# Patient Record
Sex: FEMALE | Race: ASIAN | NOT HISPANIC OR LATINO | Employment: FULL TIME | URBAN - METROPOLITAN AREA
[De-identification: names, ages, dates, MRNs, and addresses within clinical notes are randomized per-mention and may not be internally consistent; named-entity substitution may affect disease eponyms.]

---

## 2017-11-03 ENCOUNTER — HOSPITAL ENCOUNTER (OUTPATIENT)
Dept: MAMMOGRAPHY | Facility: HOSPITAL | Age: 52
Discharge: HOME | End: 2017-11-03
Attending: FAMILY MEDICINE

## 2017-11-03 DIAGNOSIS — Z12.31 VISIT FOR SCREENING MAMMOGRAM: ICD-10-CM

## 2017-11-10 ENCOUNTER — RECORDS - HEALTHEAST (OUTPATIENT)
Dept: ADMINISTRATIVE | Facility: OTHER | Age: 52
End: 2017-11-10

## 2017-11-29 ENCOUNTER — RECORDS - HEALTHEAST (OUTPATIENT)
Dept: ADMINISTRATIVE | Facility: OTHER | Age: 52
End: 2017-11-29

## 2018-08-07 ENCOUNTER — COMMUNICATION - HEALTHEAST (OUTPATIENT)
Dept: SCHEDULING | Facility: CLINIC | Age: 53
End: 2018-08-07

## 2018-08-13 ENCOUNTER — OFFICE VISIT - HEALTHEAST (OUTPATIENT)
Dept: FAMILY MEDICINE | Facility: CLINIC | Age: 53
End: 2018-08-13

## 2018-08-13 DIAGNOSIS — F41.9 ANXIETY: ICD-10-CM

## 2018-08-13 DIAGNOSIS — F32.0 MILD MAJOR DEPRESSION (H): ICD-10-CM

## 2018-08-13 DIAGNOSIS — R00.2 PALPITATIONS: ICD-10-CM

## 2018-08-13 LAB
ALBUMIN SERPL-MCNC: 4.2 G/DL (ref 3.5–5)
ALP SERPL-CCNC: 67 U/L (ref 45–120)
ALT SERPL W P-5'-P-CCNC: 17 U/L (ref 0–45)
ANION GAP SERPL CALCULATED.3IONS-SCNC: 12 MMOL/L (ref 5–18)
AST SERPL W P-5'-P-CCNC: 15 U/L (ref 0–40)
ATRIAL RATE - MUSE: 69 BPM
BILIRUB SERPL-MCNC: 0.3 MG/DL (ref 0–1)
BUN SERPL-MCNC: 15 MG/DL (ref 8–22)
CALCIUM SERPL-MCNC: 9.6 MG/DL (ref 8.5–10.5)
CHLORIDE BLD-SCNC: 110 MMOL/L (ref 98–107)
CO2 SERPL-SCNC: 21 MMOL/L (ref 22–31)
CREAT SERPL-MCNC: 0.69 MG/DL (ref 0.6–1.1)
DIASTOLIC BLOOD PRESSURE - MUSE: NORMAL MMHG
ERYTHROCYTE [DISTWIDTH] IN BLOOD BY AUTOMATED COUNT: 11.7 % (ref 11–14.5)
GFR SERPL CREATININE-BSD FRML MDRD: >60 ML/MIN/1.73M2
GLUCOSE BLD-MCNC: 90 MG/DL (ref 70–125)
HCT VFR BLD AUTO: 41.5 % (ref 35–47)
HGB BLD-MCNC: 13.7 G/DL (ref 12–16)
INTERPRETATION ECG - MUSE: NORMAL
MCH RBC QN AUTO: 28.6 PG (ref 27–34)
MCHC RBC AUTO-ENTMCNC: 33.1 G/DL (ref 32–36)
MCV RBC AUTO: 87 FL (ref 80–100)
P AXIS - MUSE: 23 DEGREES
PLATELET # BLD AUTO: 306 THOU/UL (ref 140–440)
PMV BLD AUTO: 7.4 FL (ref 7–10)
POTASSIUM BLD-SCNC: 4.5 MMOL/L (ref 3.5–5)
PR INTERVAL - MUSE: 164 MS
PROT SERPL-MCNC: 7.3 G/DL (ref 6–8)
QRS DURATION - MUSE: 86 MS
QT - MUSE: 402 MS
QTC - MUSE: 430 MS
R AXIS - MUSE: 30 DEGREES
RBC # BLD AUTO: 4.8 MILL/UL (ref 3.8–5.4)
SODIUM SERPL-SCNC: 143 MMOL/L (ref 136–145)
SYSTOLIC BLOOD PRESSURE - MUSE: NORMAL MMHG
T AXIS - MUSE: 37 DEGREES
TSH SERPL DL<=0.005 MIU/L-ACNC: 0.7 UIU/ML (ref 0.3–5)
VENTRICULAR RATE- MUSE: 69 BPM
WBC: 6.2 THOU/UL (ref 4–11)

## 2018-08-16 ENCOUNTER — HOSPITAL ENCOUNTER (OUTPATIENT)
Dept: CARDIOLOGY | Facility: CLINIC | Age: 53
Discharge: HOME | End: 2018-08-16
Attending: FAMILY MEDICINE

## 2018-08-16 DIAGNOSIS — R00.2 PALPITATIONS: ICD-10-CM

## 2018-08-17 ENCOUNTER — COMMUNICATION - HEALTHEAST (OUTPATIENT)
Dept: FAMILY MEDICINE | Facility: CLINIC | Age: 53
End: 2018-08-17

## 2018-09-19 ENCOUNTER — OFFICE VISIT - HEALTHEAST (OUTPATIENT)
Dept: FAMILY MEDICINE | Facility: CLINIC | Age: 53
End: 2018-09-19

## 2018-09-19 DIAGNOSIS — F41.9 ANXIETY: ICD-10-CM

## 2018-09-19 DIAGNOSIS — R00.2 PALPITATIONS: ICD-10-CM

## 2018-09-19 DIAGNOSIS — Z00.00 ROUTINE GENERAL MEDICAL EXAMINATION AT A HEALTH CARE FACILITY: ICD-10-CM

## 2018-09-19 DIAGNOSIS — F32.0 MILD MAJOR DEPRESSION (H): ICD-10-CM

## 2018-09-19 LAB
CHOLEST SERPL-MCNC: 181 MG/DL
FASTING STATUS PATIENT QL REPORTED: YES
FASTING STATUS PATIENT QL REPORTED: YES
GLUCOSE BLD-MCNC: 97 MG/DL (ref 70–99)
HDLC SERPL-MCNC: 46 MG/DL
LDLC SERPL CALC-MCNC: 119 MG/DL
TRIGL SERPL-MCNC: 82 MG/DL

## 2018-09-19 ASSESSMENT — MIFFLIN-ST. JEOR: SCORE: 1354.88

## 2018-09-20 LAB
HPV SOURCE: NORMAL
HUMAN PAPILLOMA VIRUS 16 DNA: NEGATIVE
HUMAN PAPILLOMA VIRUS 18 DNA: NEGATIVE
HUMAN PAPILLOMA VIRUS FINAL DIAGNOSIS: NORMAL
HUMAN PAPILLOMA VIRUS OTHER HR: NEGATIVE
SPECIMEN DESCRIPTION: NORMAL

## 2018-09-28 LAB
BKR LAB AP ABNORMAL BLEEDING: NO
BKR LAB AP BIRTH CONTROL/HORMONES: NORMAL
BKR LAB AP CERVICAL APPEARANCE: NORMAL
BKR LAB AP GYN ADEQUACY: NORMAL
BKR LAB AP GYN INTERPRETATION: NORMAL
BKR LAB AP HPV REFLEX: NORMAL
BKR LAB AP LMP: NORMAL
BKR LAB AP PATIENT STATUS: NORMAL
BKR LAB AP PREVIOUS ABNORMAL: NORMAL
BKR LAB AP PREVIOUS NORMAL: 2012
HIGH RISK?: NO
PATH REPORT.COMMENTS IMP SPEC: NORMAL
RESULT FLAG (HE HISTORICAL CONVERSION): NORMAL

## 2018-12-14 ENCOUNTER — HOSPITAL ENCOUNTER (OUTPATIENT)
Dept: MAMMOGRAPHY | Facility: CLINIC | Age: 53
Discharge: HOME | End: 2018-12-14
Attending: FAMILY MEDICINE

## 2018-12-14 DIAGNOSIS — Z12.31 VISIT FOR SCREENING MAMMOGRAM: ICD-10-CM

## 2018-12-17 ENCOUNTER — HOSPITAL ENCOUNTER (OUTPATIENT)
Dept: MAMMOGRAPHY | Facility: CLINIC | Age: 53
Discharge: HOME | End: 2018-12-17
Attending: FAMILY MEDICINE

## 2019-02-04 ENCOUNTER — OFFICE VISIT - HEALTHEAST (OUTPATIENT)
Dept: FAMILY MEDICINE | Facility: CLINIC | Age: 54
End: 2019-02-04

## 2019-02-04 DIAGNOSIS — R20.0 ARM NUMBNESS LEFT: ICD-10-CM

## 2019-02-04 DIAGNOSIS — R06.83 SNORES: ICD-10-CM

## 2019-02-11 ENCOUNTER — HOSPITAL ENCOUNTER (OUTPATIENT)
Dept: PHYSICAL MEDICINE AND REHAB | Facility: CLINIC | Age: 54
Discharge: HOME | End: 2019-02-11
Attending: FAMILY MEDICINE

## 2019-02-11 DIAGNOSIS — R20.0 ARM NUMBNESS LEFT: ICD-10-CM

## 2019-04-05 ENCOUNTER — OFFICE VISIT - HEALTHEAST (OUTPATIENT)
Dept: SLEEP MEDICINE | Facility: CLINIC | Age: 54
End: 2019-04-05

## 2019-04-05 DIAGNOSIS — E66.3 OVERWEIGHT: ICD-10-CM

## 2019-04-05 DIAGNOSIS — R06.83 SNORING: ICD-10-CM

## 2019-04-05 DIAGNOSIS — G47.10 EXCESSIVE SLEEPINESS: ICD-10-CM

## 2019-04-05 ASSESSMENT — MIFFLIN-ST. JEOR: SCORE: 1339.57

## 2019-12-23 ENCOUNTER — OFFICE VISIT - HEALTHEAST (OUTPATIENT)
Dept: FAMILY MEDICINE | Facility: CLINIC | Age: 54
End: 2019-12-23

## 2019-12-23 DIAGNOSIS — Z23 NEED FOR IMMUNIZATION AGAINST INFLUENZA: ICD-10-CM

## 2019-12-23 DIAGNOSIS — Z00.00 ROUTINE GENERAL MEDICAL EXAMINATION AT A HEALTH CARE FACILITY: ICD-10-CM

## 2019-12-23 DIAGNOSIS — M79.621 PAIN OF RIGHT UPPER ARM: ICD-10-CM

## 2019-12-23 DIAGNOSIS — R20.2 PARESTHESIA: ICD-10-CM

## 2019-12-23 ASSESSMENT — ANXIETY QUESTIONNAIRES
4. TROUBLE RELAXING: SEVERAL DAYS
6. BECOMING EASILY ANNOYED OR IRRITABLE: NOT AT ALL
3. WORRYING TOO MUCH ABOUT DIFFERENT THINGS: SEVERAL DAYS
IF YOU CHECKED OFF ANY PROBLEMS ON THIS QUESTIONNAIRE, HOW DIFFICULT HAVE THESE PROBLEMS MADE IT FOR YOU TO DO YOUR WORK, TAKE CARE OF THINGS AT HOME, OR GET ALONG WITH OTHER PEOPLE: NOT DIFFICULT AT ALL
5. BEING SO RESTLESS THAT IT IS HARD TO SIT STILL: NOT AT ALL
1. FEELING NERVOUS, ANXIOUS, OR ON EDGE: SEVERAL DAYS
7. FEELING AFRAID AS IF SOMETHING AWFUL MIGHT HAPPEN: NOT AT ALL
2. NOT BEING ABLE TO STOP OR CONTROL WORRYING: NOT AT ALL
GAD7 TOTAL SCORE: 3

## 2019-12-23 ASSESSMENT — PATIENT HEALTH QUESTIONNAIRE - PHQ9: SUM OF ALL RESPONSES TO PHQ QUESTIONS 1-9: 2

## 2019-12-23 ASSESSMENT — MIFFLIN-ST. JEOR: SCORE: 1340.71

## 2020-01-24 ENCOUNTER — HOSPITAL ENCOUNTER (OUTPATIENT)
Dept: MAMMOGRAPHY | Facility: CLINIC | Age: 55
Discharge: HOME | End: 2020-01-24
Attending: FAMILY MEDICINE

## 2020-01-24 DIAGNOSIS — Z12.31 VISIT FOR SCREENING MAMMOGRAM: ICD-10-CM

## 2020-02-10 ENCOUNTER — HOSPITAL ENCOUNTER (OUTPATIENT)
Dept: PHYSICAL MEDICINE AND REHAB | Facility: CLINIC | Age: 55
Discharge: HOME | End: 2020-02-10
Attending: FAMILY MEDICINE

## 2020-02-10 DIAGNOSIS — R20.2 PARESTHESIA: ICD-10-CM

## 2020-02-28 ENCOUNTER — COMMUNICATION - HEALTHEAST (OUTPATIENT)
Dept: FAMILY MEDICINE | Facility: CLINIC | Age: 55
End: 2020-02-28

## 2020-04-06 ENCOUNTER — RECORDS - HEALTHEAST (OUTPATIENT)
Dept: ADMINISTRATIVE | Facility: OTHER | Age: 55
End: 2020-04-06

## 2020-04-09 ENCOUNTER — RECORDS - HEALTHEAST (OUTPATIENT)
Dept: ADMINISTRATIVE | Facility: OTHER | Age: 55
End: 2020-04-09

## 2020-04-27 ENCOUNTER — RECORDS - HEALTHEAST (OUTPATIENT)
Dept: ADMINISTRATIVE | Facility: OTHER | Age: 55
End: 2020-04-27

## 2021-05-26 ASSESSMENT — PATIENT HEALTH QUESTIONNAIRE - PHQ9: SUM OF ALL RESPONSES TO PHQ QUESTIONS 1-9: 2

## 2021-05-27 NOTE — PROGRESS NOTES
Dear  Perico Viramontes Md  [address] [place] [address]    Thank you for the opportunity to participate in the care of  Marge Simmons.    Marge Simmons is sent by Perico Viramontes Th* for a sleep consultation regarding snoring and excessive sleepiness.     She has a history of depression and anxiety.    Schedule - Works in [employer] service at [employer].  Typically working 9 AM - 2 PM TW.    Wakes around 6 - 6:30 AM and usually doesn't need 6:50 AM alarm, although she won't get out of bed until closer to 6:50 AM.  No napping.  Gets into bed anywhere between 10:30 PM and midnight.    Sleep Disordered Breathing - Snoring is loud enough to wake . Has snort arousals but no witnessed apneas.   Has nocturia once per night.  No nocturnal GERD.   Upon waking feels ok.  She denies morning headaches.  Does get morning dry mouth.  No morning sinus congestion.   Patient was counseled on the importance of driving while alert, to pull over if drowsy, or nap before getting into the vehicle if sleepy.    Movement/Behaviors - No somniloquy.  No somnambulism.  No sleep related eating.  No dream enactment behavior.   She reports bruxism. Has splint and retainer.     Patient denies typical restless legs syndrome symptoms.    Alcohol use - None  Caffeine intake - coffee 1 - 2 cups /day. Last caffeine intake is usually in the morning.  Tobacco exposure - none  Illicit substances - none    Lives with  and 2 kids (16 and 12).  Has 1 pet, a dog.     Did have family history of snoring in father, but no formally diagnosed Obstructive Sleep Apnea.    Past Medical History:  History reviewed. No pertinent past medical history.    Problem List:  Patient Active Problem List    Diagnosis Date Noted     Mild major depression (H) 09/19/2018     Anxiety 09/19/2018     Overweight      Overview Note:     Created by Conversion         External Hemorrhoids With Bleeding      Overview Note:     Created by  Conversion            Past Surgical History:  Past Surgical History:   Procedure Laterality Date      SECTION  2007     DILATION AND CURETTAGE, DIAGNOSTIC / THERAPEUTIC  2008     OOPHORECTOMY Bilateral 2012     OVARIAN CYST REMOVAL  2007     [place] MYOMECTOMY 1-4,W/TOT 250GMS/<,ABD APPRCH  1999    Uterine Myomectomy         Meds:  Current Outpatient Medications   Medication Sig Dispense Refill     miscellaneous medical supply Misc Massage Therapy  Evaluate and Treat as indicated by protocol  Dx: pain and paresthesia 1 each 0     miscellaneous medical supply Misc Acupuncture Therapy  Evaluate and Treat as indicated by protocol  Dx: pain and paresthesia 1 each 0     multivitamin with minerals (THERA-M) 9 mg iron-400 mcg Tab tablet Take 1 tablet by mouth daily.       No current facility-administered medications for this visit.         Allergies:  Azithromycin     Social History:  Social History     Socioeconomic History     Marital status:      Spouse name: Not on file     Number of children: Not on file     Years of education: Not on file     Highest education level: Not on file   Occupational History     Not on file   Social Needs     Financial resource strain: Not on file     Food insecurity:     Worry: Not on file     Inability: Not on file     Transportation needs:     Medical: Not on file     Non-medical: Not on file   Tobacco Use     Smoking status: Never Smoker     Smokeless tobacco: Never Used   Substance and Sexual Activity     Alcohol use: No     Drug use: Not on file     Sexual activity: Not on file   Lifestyle     Physical activity:     Days per week: Not on file     Minutes per session: Not on file     Stress: Not on file   Relationships     Social connections:     Talks on phone: Not on file     Gets together: Not on file     Attends Yazdanism service: Not on file     Active member of club or organization: Not on file     Attends meetings of clubs or organizations:  "Not on file     Relationship status: Not on file     Intimate partner violence:     Fear of current or ex partner: Not on file     Emotionally abused: Not on file     Physically abused: Not on file     Forced sexual activity: Not on file   Other Topics Concern     Not on file   Social History Narrative     Not on file        Family History:  Family History   Problem Relation Age of Onset     Prostate cancer Father         stage 1     Colon cancer Maternal Grandmother 55     Parkinsonism Maternal Grandfather      Stomach cancer Paternal Grandfather 79     Tongue cancer Maternal Uncle 60        Review of Systems: Changes in vision  Constitutional: Negative except as noted in HPI.   Eyes: Negative except as noted in HPI.   ENT: Negative except as noted in HPI.   Cardiovascular: Negative except as noted in HPI.   Respiratory: Negative except as noted in HPI.   Gastrointestinal: Negative except as noted in HPI.   Genitourinary: Negative except as noted in HPI.   Musculoskeletal: Negative except as noted in HPI.   Integumentary: Negative except as noted in HPI.   Neurological: Negative except as noted in HPI.   Psychiatric: Negative except as noted in HPI.   Endocrine: Negative except as noted in HPI.   Hematologic/Lymphatic: Negative except as noted in HPI.      Physical Exam:  /66 (Patient Site: Right Arm, Patient Position: Sitting, Cuff Size: Adult Regular)   Pulse 62   Ht 5' 3.5\" (1.613 m)   Wt 167 lb (75.8 kg)   SpO2 98%   BMI 29.12 kg/m    General appearance: No apparent distress, well groomed.    HEENT:   Head: Normocephalic, atraumatic.  Eyes: PERRL  Nose: Nares patent.  No exudate.  No septal deviation noted.  Mouth: Teeth: Normal   Tongue: Scalloped  Oropharynx:  Mallampati Classification: III    Tonsils: Grade 2 B and low    Uvula: Normal    Neck: Supple without bruit.      Cardiac: Regular rate and rhythm.  No murmurs.  Radial pulses are strong and symmetric.  Pulmonary: Symmetric air movement, " lungs clear to auscultation bilaterally.  Musculoskeletal: No edema noted.  No clubbing or cyanosis.  Skin: Warm, dry, intact.  Neurologic: Alert and oriented to person, place and time   Gait is normal.  Psychiatric: Affect pleasant.  Mood ok.     Labs/Studies:  Lab Results   Component Value Date    WBC 6.2 08/13/2018    HGB 13.7 08/13/2018    HCT 41.5 08/13/2018    MCV 87 08/13/2018     08/13/2018         Chemistry        Component Value Date/Time     08/13/2018 1434    K 4.5 08/13/2018 1434     (H) 08/13/2018 1434    CO2 21 (L) 08/13/2018 1434    BUN 15 08/13/2018 1434    CREATININE 0.69 08/13/2018 1434    GLU 97 09/19/2018 0959        Component Value Date/Time    CALCIUM 9.6 08/13/2018 1434    ALKPHOS 67 08/13/2018 1434    AST 15 08/13/2018 1434    ALT 17 08/13/2018 1434    BILITOT 0.3 08/13/2018 1434            Lab Results   Component Value Date    FERRITIN 64 06/09/2014     Lab Results   Component Value Date    TSH 0.70 08/13/2018     No results found for: HGBA1C    STOP BANG 4/5/2019   Do you snore loudly (louder than talking or loud enough to be heard through closed doors)? 1   Do you often feel tired, fatigued, or sleepy during daytime? 1   Has anyone observed you stop breathing in your sleep? 0   Do you have or are you being treated for high blood pressure? 0   BMI more than 35 kg/m2 0   Age over 50 years old? 1   Neck circumference greater than 16 inches? 0   Gender male? 0   Total Score 3     Assessment and Plan:  1. Snoring  I have a low-to-intermediate suspicion for STEPHON based on presence of snoring and ESS. We discussed pathophysiology of obstructive sleep apnea, testing with overnight polysomnography, and treatment modalities (CPAP only discussed at this visit). We discussed consequences of untreated STEPHON. Patient is interested in treatment and willing to undergo overnight sleep testing.    Home sleep testing is inappropriate for this patient due to lack of high pretest probability  for moderate to severe STEPHON.    Study has been ordered today.      In case of insomnia during the study:  one-time medication has been ordered.   - Split Night Sleep Study; Future    2. Excessive sleepiness  Concern for STEPHON vs insufficient sleep.  - Split Night Sleep Study; Future    3. Overweight  We discussed the link between weight, sleep apnea, and health outcomes.  Patient was encouraged to decrease caloric intake and increase activity levels to try to move towards a normal weight.  She was encouraged to discuss further strategies with her primary care provider.   - Split Night Sleep Study; Future     Patient verbalized understanding of these issues, agrees with the plan and all questions were answered today. Patient was given an opportuntity to voice any other symptoms or concerns not listed above. Patient did not have any other symptoms or concerns.      Al Dias MD  [facility] Board Certified in Internal Medicine and Sleep Medicine  [facility] [place].

## 2021-05-28 ASSESSMENT — ANXIETY QUESTIONNAIRES: GAD7 TOTAL SCORE: 3

## 2021-05-29 ENCOUNTER — RECORDS - HEALTHEAST (OUTPATIENT)
Dept: ADMINISTRATIVE | Facility: CLINIC | Age: 56
End: 2021-05-29

## 2021-05-30 ENCOUNTER — RECORDS - HEALTHEAST (OUTPATIENT)
Dept: ADMINISTRATIVE | Facility: CLINIC | Age: 56
End: 2021-05-30

## 2021-05-31 ENCOUNTER — RECORDS - HEALTHEAST (OUTPATIENT)
Dept: ADMINISTRATIVE | Facility: CLINIC | Age: 56
End: 2021-05-31

## 2021-06-01 VITALS — BODY MASS INDEX: 30.29 KG/M2 | WEIGHT: 171 LBS

## 2021-06-02 ENCOUNTER — RECORDS - HEALTHEAST (OUTPATIENT)
Dept: ADMINISTRATIVE | Facility: CLINIC | Age: 56
End: 2021-06-02

## 2021-06-02 VITALS — HEIGHT: 64 IN | WEIGHT: 170.38 LBS | BODY MASS INDEX: 29.09 KG/M2

## 2021-06-02 VITALS — WEIGHT: 173.2 LBS | BODY MASS INDEX: 30.2 KG/M2

## 2021-06-02 VITALS — HEIGHT: 64 IN | WEIGHT: 167 LBS | BODY MASS INDEX: 28.51 KG/M2

## 2021-06-04 VITALS
HEART RATE: 72 BPM | WEIGHT: 169 LBS | HEIGHT: 63 IN | DIASTOLIC BLOOD PRESSURE: 70 MMHG | BODY MASS INDEX: 29.95 KG/M2 | SYSTOLIC BLOOD PRESSURE: 112 MMHG

## 2021-06-06 NOTE — TELEPHONE ENCOUNTER
Left message for patient about her EMG.  Mild carpal tunnel right wrist otherwise negative EMG.  If she is still symptomatic (arm numbness), I asked her to call back and I will give her a referral to orthopedic.

## 2021-06-06 NOTE — PATIENT INSTRUCTIONS - HE
Thank you for choosing the [facility] for your EMG testing.    The ordering provider will receive your final EMG results within the next few days.  Please follow up with your provider for the results and further treatment recommendations.

## 2021-06-06 NOTE — PROGRESS NOTES
Patient presents at the request of Dr. Perico Ludwig for a right upper extremity EMG.  She has 1 month history of right-sided neck parascapular pain with right arm pain and paresthesias to digits 1 through 3.  She is left-handed.    EMG/NCS  results: Please see scanned full report.    Comment NCS: Abnormal study:    1.  Prolonged right median versus ulnar transcarpal latency comparison.  2.  Normal right median ulnar radial SNAPs, ulnar transcarpal study, median and ulnar CMAPs.    Comment EMG: Normal study.  1.  Normal needle EMG right upper extremity.    Interpretation: Abnormal study: There is electrodiagnostic evidence of:    1.  Right median neuropathy at the wrist consistent with entrapment in the carpal tunnel, very mild in severity.    2. There is no electrodiagnostic evidence of cervical radiculopathy, brachial plexopathy, or ulnar neuropathy in the right upper extremity.    Note: Clinically, this could represent an EMG negative cervical radiculopathy.  If she continues to have a symptoms despite conservative management, can consider cervical MRI for further evaluation.    The testing was completed in its entirety by the physician.      It was our pleasure caring for your patient today, if there any questions or concerns please do not hesitate to contact us.

## 2021-06-06 NOTE — TELEPHONE ENCOUNTER
Test Results  Who is calling?:  Patient  Who ordered the test:    Perico Viramontes MD  Type of test: Other: EMG  Date of test:  2/10/2020  Where was the test performed:    [facility]  What are your questions/concerns?:    Patient is requesting results of test.  Okay to leave a detailed message?:  Yes

## 2021-06-16 PROBLEM — F32.0 MILD MAJOR DEPRESSION (H): Status: ACTIVE | Noted: 2018-09-19

## 2021-06-16 PROBLEM — F41.9 ANXIETY: Status: ACTIVE | Noted: 2018-09-19

## 2021-06-19 NOTE — PROGRESS NOTES
"ASSESSMENT/PLAN:    Palpitations  This is a 53-year-old female who presented with brief episode of vibration and pressure at the base of her neck.  Symptoms appeared consistent with that of anxiety and depression.  We spoke about treatment for anxiety depression and patient opted for psychotherapy.  EKG was unremarkable.  Because she is not symptomatic now I will like her to wear a portable arrhythmia monitor for a week.  We will also obtain CBC, CMP, and thyroid cascade.  I would like the patient to follow-up with me in 1 month.  We spoke about differential diagnoses and workup as discussed above.  She verbalized understanding and agreed with plan  -     Ambulatory referral for EKG  -     Electrocardiogram Perform and Read  -     Thyroid Cascade  -     HM2(CBC w/o Differential)  -     MILTON Hook-Up; Future    Mild major depression (H), anxiety  We spoke about pharmacotherapy and psychotherapy.  Patient opted for psychotherapy.  I also discussed with the patient about reaching out to her  connecting with him on an intermittent level rather than \"best friends\".  Motivational interviewing was utilized today.  I will ask her to follow-up with me in 1 month  -     Ambulatory referral to Psychology    SUBJECTIVE:    Marge Simmons is a 53 y.o. female who came in today for concerns regarding palpitation.  2 weeks ago while on a road trip to explore her son's college, patient fell palpitation for which she described to be pulsatory pressure sensation at the base of her neck.  This occurred for about 5 - 6 days continuously.  It had stopped several days ago.  During the time of this episode, she did not have any chest pain, shortness of breath, exertional dyspnea, dysphagia, odynophagia, nausea, vomiting, abdominal pain.  She was drinking more caffeine than she normally consumes.  She was also worrying about finances and paying for her son's college.  He is also worried about her aging parents in [place].  Her relationship " "with her  is considered \"best friend\" although she would like to take it to an intimate level.      More than half the days she has lack of interest and pleasure in doing things.  More than half the days she has trouble with sleep and energy.  More than half the days she has difficulty with appetite and feels bad about herself.  She does not have any suicidal homicidal ideation.  PHQ9=14    More than half the days she has trouble relaxing.  On several days she feels nervous and anxious on edge.  On several days she feels that she can control her worrying worries about different things.  On several days she feels easily annoyed and irritable.  On several days she feels like something awful might happen to her.  GAD7=7    At this time she takes no medications.    Review of Systems (except those mentioned above)  Constitutional: Negative.   HENT: Negative.   Eyes: Negative.   Respiratory: Negative.   Cardiovascular: Negative.   Gastrointestinal: Negative.   Endocrine: Negative.   Genitourinary: Negative.   Musculoskeletal: Negative.   Skin: Negative.   Allergic/Immunologic: Negative.   Neurological: Negative.   Hematological: Negative.   Psychiatric/Behavioral: Negative.     Patient Active Problem List    Diagnosis Date Noted     Overweight      External Hemorrhoids With Bleeding      Allergies   Allergen Reactions     Azithromycin      Current Outpatient Prescriptions   Medication Sig Dispense Refill     multivitamin with minerals (THERA-M) 9 mg iron-400 mcg Tab tablet Take 1 tablet by mouth daily.       No current facility-administered medications for this visit.      No past medical history on file.  Past Surgical History:   Procedure Laterality Date      SECTION  2007     DILATION AND CURETTAGE, DIAGNOSTIC / THERAPEUTIC  2008     OOPHORECTOMY Bilateral 2012     OVARIAN CYST REMOVAL  2007     [place] MYOMECTOMY 1-4,W/TOT 250GMS/<,ABD APPRCH  1999    Uterine Myomectomy      Social " History     Social History     Marital status:      Spouse name: N/A     Number of children: N/A     Years of education: N/A     Social History Main Topics     Smoking status: Never Smoker     Smokeless tobacco: Never Used     Alcohol use None     Drug use: None     Sexual activity: Not Asked     Other Topics Concern     None     Social History Narrative     Family History   Problem Relation Age of Onset     Prostate cancer Father      stage 1     Colon cancer Maternal Grandmother 55     Parkinsonism Maternal Grandfather      Stomach cancer Paternal Grandfather 79     Tongue cancer Maternal Uncle 60         OBJECTIVE:    Vitals:    08/13/18 1336   BP: 94/68   Patient Site: Left Arm   Patient Position: Sitting   Cuff Size: Adult Large   Pulse: 76   Weight: 171 lb (77.6 kg)     Body mass index is 30.29 kg/(m^2).    Physical Exam:  Constitutional: Patient was oriented to person, place, and time. Patient appeared well-developed and well-nourished. No distress.   Head: Normocephalic and atraumatic.   Right Ear: External ear normal. Normal TM  Left Ear: External ear normal. Normal TM  Nose: Nose normal.   Mouth/Throat: Oropharynx was clear and moist. No oropharyngeal exudate.   Eyes: Conjunctivae and EOM were normal. Pupils were equal, round, and reactive to light. Right eye exhibited no discharge. Left eye exhibited no discharge. No scleral icterus.   Neck: Neck supple. No JVD present. No tracheal deviation present. No thyromegaly present.   Lymphadenopathy:  Patient has no cervical adenopathy.   Cardiovascular: Normal rate, regular rhythm, normal heart sounds and intact distal pulses. No murmur heard.   Pulmonary/Chest: Effort normal and breath sounds normal. No stridor. No respiratory distress. Patient had no wheezes, no rales, exhibits no tenderness.   The patient has good eye contact.  No psychomotor retardation or stereotypical behaviors.  Speech was regular rate, regular rhythm, adequate responses.  Mood was  stable and affect was congruent mood.  No suicidal or homicidal intent.  No hallucination.      Results for orders placed or performed in visit on 08/13/18   Electrocardiogram Perform and Read   Result Value Ref Range    SYSTOLIC BLOOD PRESSURE  mmHg    DIASTOLIC BLOOD PRESSURE  mmHg    VENTRICULAR RATE 69 BPM    ATRIAL RATE 69 BPM    P-R INTERVAL 164 ms    QRS DURATION 86 ms    Q-T INTERVAL 402 ms    QTC CALCULATION (BEZET) 430 ms    P Axis 23 degrees    R AXIS 30 degrees    T AXIS 37 degrees    MUSE DIAGNOSIS       Normal sinus rhythm  Normal ECG  No previous ECGs available

## 2021-06-20 NOTE — PROGRESS NOTES
FEMALE PREVENTATIVE EXAM    Assessment and Plan:     Routine general medical examination at a health care facility  We discussed healthy lifestyle, nutrition, cardiovascular risk reduction, self care, safety, sunscreen, seatbelt, and timing of cancer screening.  Health maintenance screening and immunizations reviewed with the patient.  -     Lipid Cascade  -     Glucose  -     Gynecologic Cytology (PAP Smear)  -     Td, Preservative Free (green label)    Palpitations  Normal EKG  Unremarkable Holter  Suspect anxiety  Episodes are occurring less frequently    Mild major depression (H), Anxiety  Stable with psychotherapy      Next follow up: Yearly for preventative visits    Immunization Review  Adult Imm Review: Due today, orders placed    I discussed the following with the patient:   Adult Healthy Living: Importance of regular exercise  Healthy nutrition      Subjective:   Chief Complaint: Marge Simmons is an 53 y.o. female here for a preventative health visit.     HPI: The patient does not have any questions or concerns today.  Her palpitation for which I saw her a month ago or lessening as well.  She is not having any worsening depression and anxiety symptoms.  She had opted for psychotherapy over pharmacotherapy during our last visit.    Healthy Habits  Are you taking a daily aspirin? No  Do you typically exercising at least 40 min, 3-4 times per week?  Yes  Do you usually eat at least 4 servings of fruit and vegetables a day, include whole grains and fiber and avoid regularly eating high fat foods? Yes  Have you had an eye exam in the past two years? Yes  Do you see a dentist twice per year? Yes  Do you have any concerns regarding sleep? YES    Safety Screen  If you own firearms, are they secured in a locked gun cabinet or with trigger locks? The patient does not own any firearms  No Data Recorded    Review of Systems:  Please see above.  The rest of the review of systems are negative for all systems.     Pap  "History:   Yes - updated in Problem List and Health Maintenance accordingly  Cancer Screening       Status Date      PAP SMEAR Overdue 1/30/2017      Done 1/30/2012 GYNECOLOGIC CYTOLOGY (PAP SMEAR)    MAMMOGRAM Next Due 11/3/2018      Done 11/3/2017 MAMMO SCREENING BILATERAL     Patient has more history with this topic...    COLONOSCOPY Next Due 2/20/2023      Done 2/20/2013 ENDOSCOPY, COLON          Patient Care Team:  Perico Ludwig MD as PCP - General        History     Reviewed By Date/Time Sections Reviewed    Adarsh Dikc CMA 9/19/2018  9:22 AM Tobacco            Objective:   Vital Signs:   Visit Vitals     /70     Pulse 60     Ht 5' 3.5\" (1.613 m)     Wt 170 lb 6 oz (77.3 kg)     BMI 29.71 kg/m2          PHYSICAL EXAM    Objective:    Physical Exam   Vitals:    09/19/18 0922   BP: 118/70   Pulse: 60      Constitutional: Patient is oriented to person, place, and time. Patient appears well-developed and well-nourished. No distress.   Head: Normocephalic and atraumatic.   Right Ear: External ear normal.   Left Ear: External ear normal.   Nose: Nose normal.   Mouth/Throat: Oropharynx is clear and moist. No oropharyngeal exudate.   Eyes: Conjunctivae and EOM are normal. Pupils are equal, round, and reactive to light. Right eye exhibits no discharge. Left eye exhibits no discharge. No scleral icterus.   Neck: Neck supple. No JVD present. No tracheal deviation present. No thyromegaly present.   Cardiovascular: Normal rate, regular rhythm, normal heart sounds and intact distal pulses. No murmur heard.   Pulmonary/Chest: Effort normal and breath sounds normal. No stridor. No respiratory distress. Patient has no wheezes, no rales, exhibits no tenderness.   Abdominal: Soft. Bowel sounds are normal. Patient exhibits no distension and no mass. There is no tenderness. There is no rebound and no guarding.   Lymphadenopathy:  Patient has no cervical adenopathy.   Neurological: Patient is alert and " oriented to person, place, and time. Patient has normal reflexes. No cranial nerve deficit. Coordination normal.   Skin: Skin is warm and dry. No rash noted. Patient is not diaphoretic. No erythema. No pallor.   Normal breast exam  Normal pelvic exam    The 10-year ASCVD risk score (Jamelili SANTOS Jr, et al., 2013) is: 1.1%    Values used to calculate the score:      Age: 53 years      Sex: Female      Is Non- : No      Diabetic: No      Tobacco smoker: No      Systolic Blood Pressure: 118 mmHg      Is BP treated: No      HDL Cholesterol: 48 mg/dL      Total Cholesterol: 165 mg/dL         Medication List          These changes are accurate as of 9/19/18 11:46 AM.  If you have any questions, ask your nurse or doctor.               CONTINUE taking these medications          multivitamin with minerals 9 mg iron-400 mcg Tab tablet   Also known as:  THERA-M   INSTRUCTIONS:  Take 1 tablet by mouth daily.

## 2021-06-23 NOTE — PATIENT INSTRUCTIONS - HE
Thank you for choosing the [facility] for your EMG testing.    The ordering provider will receive your final EMG results within the next few days.  Please follow up with your provider for the results and further treatment recommendations.

## 2021-06-23 NOTE — PROGRESS NOTES
"ASSESSMENT/PLAN:  Arm numbness left  Chronic history of left pinky stiffness followed by a recent one month h/o constant pain and paresthesia of the left arm (fingers to base of neck).  We discussed differential diagnoses (neuropathy, cervical radiculopathy, etc.).  Will get EMG.  I gave her a prescription for massage and acupuncture as needed   -     EMG- Left Arm; Future  -     miscellaneous medical supply Misc; Massage Therapy  Evaluate and Treat as indicated by protocol  Dx: pain and paresthesia  -     miscellaneous medical supply Misc; Acupuncture Therapy  Evaluate and Treat as indicated by protocol  Dx: pain and paresthesia    Snores and daytime fatigue  -     Ambulatory referral to Sleep Medicine    SUBJECTIVE:    Marge Simmons is a 53 y.o. female who came in today complaining of constant paraesthesia of her L arm, hand, and fingers. Slight intermittent paraesthesia of her fingers has been present for many years however during the past 4 weeks the numbness and tingling has spread to her L elbow, arm, hand, and fingers. She is L side dominant. Patient has chronic stiffness and intermittent \"aches\" in her neck but denies sharp pain or limited ROM. She is searching for a reputable massage therapist, PT, or acupuncturist and is requesting a referral.   Patient snores at night. She wears a mouthguard for teeth-grinding that is supposed to help her snore less. Her  is \"a very deep sleeper\" but complains lately that she wakes him up snoring and that it's possible she stops breathing at times. Pt has never had a sleep study done.    Patient was struggling with her anxiety and depression more last summer. She explains that she is feeling better mentally and her heart palpitations are coming less often now that she isn't so anxious. Pt notes her ability to exercise has decreased as she has become more fatigued. She wonders if her fatigue could have something to do with her sleep habits and does not think her " fatigue is mood-related.      STOP BANG screen  Snore: yes  Tired: yes  Observe apnea: no  Pressure, blood: no  BMI >35: no  Age >50: yes  Neck size large: no  Gender, male: no  Intermediate risk:  3      Review of Systems (except those mentioned above)  Constitutional: Negative.   HENT: Negative.   Eyes: Negative.   Respiratory: Negative.   Cardiovascular: Negative.   Gastrointestinal: Negative.   Endocrine: Negative.   Genitourinary: Negative.   Musculoskeletal: Negative.   Skin: Negative.   Allergic/Immunologic: Negative.   Neurological: Negative.   Hematological: Negative.   Psychiatric/Behavioral: Negative.     Patient Active Problem List    Diagnosis Date Noted     Mild major depression (H) 2018     Anxiety 2018     Overweight      External Hemorrhoids With Bleeding      Allergies   Allergen Reactions     Azithromycin      Current Outpatient Medications   Medication Sig Dispense Refill     multivitamin with minerals (THERA-M) 9 mg iron-400 mcg Tab tablet Take 1 tablet by mouth daily.       miscellaneous medical supply [facility] Massage Therapy  Evaluate and Treat as indicated by protocol  Dx: pain and paresthesia 1 each 0     miscellaneous medical supply [facility] Acupuncture Therapy  Evaluate and Treat as indicated by protocol  Dx: pain and paresthesia 1 each 0     No current facility-administered medications for this visit.      No past medical history on file.  Past Surgical History:   Procedure Laterality Date      SECTION  2007     DILATION AND CURETTAGE, DIAGNOSTIC / THERAPEUTIC  2008     OOPHORECTOMY Bilateral 2012     OVARIAN CYST REMOVAL  2007     CO MYOMECTOMY 1-4,W/TOT 250GMS/<,ABD APPRCH  1999    Uterine Myomectomy      Social History     Socioeconomic History     Marital status:      Spouse name: None     Number of children: None     Years of education: None     Highest education level: None   Social Needs     Financial resource strain: None     Food  insecurity - worry: None     Food insecurity - inability: None     Transportation needs - medical: None     Transportation needs - non-medical: None   Occupational History     None   Tobacco Use     Smoking status: Never Smoker     Smokeless tobacco: Never Used   Substance and Sexual Activity     Alcohol use: No     Drug use: None     Sexual activity: None   Other Topics Concern     None   Social History Narrative     None     Family History   Problem Relation Age of Onset     Prostate cancer Father         stage 1     Colon cancer Maternal Grandmother 55     Parkinsonism Maternal Grandfather      Stomach cancer Paternal Grandfather 79     Tongue cancer Maternal Uncle 60     OBJECTIVE:    Vitals:    02/04/19 1311   BP: 104/62   Patient Site: Left Arm   Patient Position: Sitting   Cuff Size: Adult Large   Pulse: 76   Weight: 173 lb 3.2 oz (78.6 kg)     Body mass index is 30.2 kg/m .    Physical Exam:  Constitutional: Patient was oriented to person, place, and time. Patient appeared well-developed and well-nourished. No distress.   Head: Normocephalic and atraumatic.   Right Ear: External ear normal.   Left Ear: External ear normal.   Nose: Nose normal.   Eyes: Conjunctivae and EOM were normal. Right eye exhibited no discharge. Left eye exhibited no discharge. No scleral icterus.   Neck: Neck supple. No JVD present. No tracheal deviation present. No thyromegaly present.   Neurological: Patient was alert and oriented to person, place, and time. No cranial nerve deficit. Coordination normal.   Skin: Skin was warm and dry. No rash noted. Patient was not diaphoretic. No erythema. No pallor.   Extremities (L Arm): Full ROM, no swelling, no abnormality, no deformity.      Results for orders placed or performed in visit on 09/19/18   Lipid Cascade   Result Value Ref Range    Cholesterol 181 <=199 mg/dL    Triglycerides 82 <=149 mg/dL    HDL Cholesterol 46 (L) >=50 mg/dL    LDL Calculated 119 <=129 mg/dL    Patient Fasting >  8hrs? Yes    Glucose   Result Value Ref Range    Glucose 97 70 - 99 mg/dL    Patient Fasting > 8hrs? Yes    Gynecologic Cytology (PAP Smear)   Result Value Ref Range    Case Report       Gynecologic Cytology Report                       Case: [id number]                                   Authorizing Provider:  Perico William         Collected:           09/19/2018 1102                                     MD Oziel                                                                 Ordering Location:     [facility]   Received:            09/19/2018 1102                                     Family Medicine/OB                                                           First Screen:          BRADY Bates                                                                             (ASCP)                                                                       Specimen:    SUREPATH PAP, SCREENING, Endocervical/cervical                                             Interpretation       Negative for squamous intraepithelial lesion or malignancy    Result Flag Normal Normal    Specimen Adequacy       Satisfactory for evaluation, endocervical/transformation zone component present    HPV Reflex? Yes regardless of result     HIGH RISK No     LMP/Menopause Date oophorectomy 2011     Abnormal Bleeding No     Pt Status na     Birth Control/Hormones None     Previous Normal/Date 2012     Prev Abn Date/Dx none     Cervical Appearance normal    HPV High Risk DNA Cervical   Result Value Ref Range    HPV Source SurePath     HPV16 DNA Negative NEG    HPV18 DNA Negative NEG    Other HR HPV Negative NEG    Final Diagnosis SEE NOTES     Specimen Description Cervical Cells      ADDITIONAL HISTORY SUMMARIZED (2): None.   DECISION TO OBTAIN EXTRA INFORMATION (1): None.   RADIOLOGY TESTS (1): None.  LABS (1): Thyroid cascade and glucose labs were reviewed from 9/19/2018, both were normal.   MEDICINE TESTS (1): L arm EMG  ordered today. Sleep study ordered today.   INDEPENDENT REVIEW (2 each): None.     Total data points = 3    By signing my name below, I, Savannah Da Silva, attest that this documentation has been prepared under the direction and in the presence of Dr. Harika William.  Electronic Signature: Jason Melissa. 02/04/2019 13:22.    I, Dr. Perico Ludwig MD , personally performed the services described in this documentation. All medical record entries made by the scribe were at my direction and in my presence. I have reviewed the chart and discharge instructions (if applicable) and agree that the record reflects my personal performance and is accurate and complete.

## 2021-06-23 NOTE — PROGRESS NOTES
Patient presents at the request of Dr. Nataliia Ludwig for left upper extremity EMG.  She has a years of left fifth digit greater than right locking and also left hand numbness and tingling throughout the whole hand and arm worse at night when she sleeps and recently with chopping while cooking.  She is left-handed.    EMG/NCS  results: Please see scanned full report.    Comment NCS: Abnormal study:    1.  Prolonged left median SNAP latency and borderline amplitude.  2.  Prolonged latency left median transcarpal study with low amplitude.  3.  Prolonged left median versus ulnar transcarpal latency comparison.  4.  Normal left ulnar and radial SNAPs, ulnar transcarpal study, median and ulnar CMAPs.    Comment EMG: Normal study.  1.  Normal needle EMG left upper extremity.    Interpretation: Abnormal study:    1.  Left median neuropathy at the wrist consistent with entrapment in the carpal tunnel, mild in severity.    2. There is no electrodiagnostic evidence of cervical radiculopathy, brachial plexopathy, or ulnar neuropathy in the left upper extremity.    Note: Patient may wish to trial over-the-counter carpal tunnel brace at night when she sleeps and will follow up with her primary care provider for further recommendations.    The testing was completed in its entirety by the physician.      It was our pleasure caring for your patient today, if there any questions or concerns please do not hesitate to contact us.

## 2021-06-26 ENCOUNTER — HEALTH MAINTENANCE LETTER (OUTPATIENT)
Age: 56
End: 2021-06-26

## 2021-06-28 NOTE — PROGRESS NOTES
Progress Notes by Perico Viramontes MD at 12/23/2019  4:00 PM     Author: Perico Viramontes MD Service: -- Author Type: Physician    Filed: 12/23/2019  4:33 PM Encounter Date: 12/23/2019 Status: Signed    : Perico Viramontes MD (Physician)       FEMALE PREVENTATIVE EXAM    Assessment and Plan:     Routine general medical examination at a health care facility  We discussed healthy lifestyle, nutrition, cardiovascular risk reduction, self care, safety, sunscreen, seatbelt, and timing of cancer screening.  Health maintenance screening and immunizations reviewed with the patient.     Need for immunization against influenza  -     Influenza,Seasonal,Quad,INJ =/>6months    Paresthesia, right arm  Started with neck and shoulder pain 1 month ago then move to pain and paresthesia of right elbow to right fingers.  EMG to localize the problem.  Patient requested chiropractic services as well  -     EMG- Right Arm; Future    Pain of right upper arm  -     Ambulatory referral to Chiropractic    Next follow up:  Return in about 1 year (around 12/23/2020).    Immunization Review  Adult Imm Review: Due today, orders placed    I discussed the following with the patient:   Adult Healthy Living: Importance of regular exercise  Healthy nutrition  Getting adequate sleep  Stress management    Subjective:   Chief Complaint: Marge Simmons is an 54 y.o. female here for a preventative health visit.     HPI:   Depression/Anxiety: She has a history of depression and anxiety. She will occasionally get anxious over small things in her life. However, it is not out of her control. She is not currently taking any medications for it. She is not concerned about her anxiety or depression.     Neck/Shoulder Pain: She started experiencing right neck and shoulder pain about a month ago. The pain was sharp. The pain in the neck is no longer present, but the shoulder pain and aching is active. The pain has now  "traveled into her right fingers causing numbness and tingling for the past two weeks. These symptoms have limited her ability to bake these last few weeks. She feels like the right arm is weak. She is left hand dominant. She has tried ibuprofen and aleve without any relief. She inquires about a prescription for chiropractic treatment.     Dark Spot in Right Armpit: She had a \"big\" uterina fibro that a physician wanted to shrink. The physician gave her hormones which clogged her gland in her arm pits. She currently has a scar in the right armpit. She has a dark spot in the right armpit. She inquires if it is a black head.     Health Maintenance: She is not fasting today. She is due for an influenza vaccination today. She had a normal pap smear in 2018. She had a negative mammogram in 2018. She has a 3-D mammogram scheduled in January. She had a colonoscopy in 2013 which was normal.     Review of Systems: Please see above. The rest of the review of systems are negative for all systems.     PSFH:  Her oldest child is a senior in high school. Her son has applied to colleges and is starting to hear back from schools.     Healthy Habits  Are you taking a daily aspirin? No  Do you typically exercising at least 40 min, 3-4 times per week?  Yes  Do you usually eat at least 4 servings of fruit and vegetables a day, include whole grains and fiber and avoid regularly eating high fat foods? Yes  Have you had an eye exam in the past two years? Yes  Do you see a dentist twice per year? Yes  Do you have any concerns regarding sleep? YES    Safety Screen  If you own firearms, are they secured in a locked gun cabinet or with trigger locks? The patient does not own any firearms  No data recorded      Pap History:   No - age 30-65 PAP every 3 years recommended  Cancer Screening       Status Date      MAMMOGRAM Next Due 12/17/2019      Done 12/17/2018 MAMMO SCREENING BILATERAL     Patient has more history with this topic...    " "COLONOSCOPY Next Due 2/20/2023      Done 2/20/2013 ENDOSCOPY, COLON    PAP SMEAR Next Due 9/19/2023      Done 9/19/2018 GYNECOLOGIC CYTOLOGY (PAP SMEAR)     Patient has more history with this topic...          Patient Care Team:  Perico Viramontes MD as PCP - General  Perico Viramontes MD as Assigned PCP    History     Reviewed By Date/Time Sections Reviewed    Adarsh Dick CMA 12/23/2019  4:03 PM Tobacco        Objective:   Vital Signs:   Visit Vitals  /70 (Patient Site: Left Arm, Patient Position: Sitting, Cuff Size: Adult Regular)   Pulse 72   Ht 5' 3\" (1.6 m)   Wt 169 lb (76.7 kg)   BMI 29.94 kg/m         PHYSICAL EXAM  Constitutional: Patient is oriented to person, place, and time. Patient appears well-developed and well-nourished. No distress.   Head: Normocephalic and atraumatic.   Right Ear: External ear normal.   Left Ear: External ear normal.   Nose: Nose normal.   Mouth/Throat: Oropharynx is clear and moist. No oropharyngeal exudate.   Eyes: Conjunctivae and EOM are normal. Pupils are equal, round, and reactive to light. Right eye exhibits no discharge. Left eye exhibits no discharge. No scleral icterus.   Neck: Neck supple. No JVD present. No tracheal deviation present. No thyromegaly present.   Breasts:  Normal appearing, no skin involvement, no palpable mass, no tenderness on palpation.  No axillary involvement  Cardiovascular: Normal rate, regular rhythm, normal heart sounds and intact distal pulses.   No murmur heard.   Pulmonary/Chest: Effort normal and breath sounds normal. No stridor. No respiratory distress. Patient has no wheezes, no rales, exhibits no tenderness.   Abdominal: Soft. Bowel sounds are normal. Patient exhibits no distension and no mass. There is no tenderness. There is no rebound and no guarding.   Lymphadenopathy:  Patient has no cervical adenopathy.   Neurological: Patient is alert and oriented to person, place, and time. Patient has normal " reflexes. No cranial nerve deficit. Coordination normal.   Skin: Skin is warm and dry. No rash noted. Patient is not diaphoretic. No erythema. No pallor. Sebaceous cyst remnant on the right armpit.   Psychiatric: Patient has good eye contact without any psychomotor retardation or stereotypic behaviors.  normal mood and affect. Judgment and thought content normal.   Speech is regular rate and rhythm.     The 10-year ASCVD risk score (Myrtle DC Jr., et al., 2013) is: 1.2%    Values used to calculate the score:      Age: 54 years      Sex: Female      Is Non- : No      Diabetic: No      Tobacco smoker: No      Systolic Blood Pressure: 112 mmHg      Is BP treated: No      HDL Cholesterol: 46 mg/dL      Total Cholesterol: 181 mg/dL    QUALITY MEASURES:  The following are part of a depression follow up plan for the patient:  case management follow-up and patient follow-up to return when and if necessary    ADDITIONAL HISTORY SUMMARIZED (2): None.  DECISION TO OBTAIN EXTRA INFORMATION (1): None.   RADIOLOGY TESTS (1): Reviewed mammogram from 12/17/18 which was negative.   LABS (1): Reviewed labs from 09/19/18.   MEDICINE TESTS (1): None.   INDEPENDENT REVIEW (2 each): None.     IPiper, am scribing for and in the presence of, Dr. William.    I, Dr. William, personally performed the services described in this documentation, as scribed by Piper Escalante in my presence, and it is both accurate and complete.    Total data points: 2       Medication List          Accurate as of December 23, 2019  4:31 PM. If you have any questions, ask your nurse or doctor.            CONTINUE taking these medications    multivitamin with minerals 9 mg iron-400 mcg Tab tablet  Also known as:  THERA-M  INSTRUCTIONS:  Take 1 tablet by mouth daily.           STOP taking these medications    miscellaneous medical supply Misc  Stopped by:  Perico Ludwig MD     zolpidem 5 MG tablet  Also known as:   SANCHEZ  Stopped by:  Perico Ludwig MD            Additional Screenings Completed Today:

## 2021-07-11 PROBLEM — F32.0 MILD MAJOR DEPRESSION (H): Status: RESOLVED | Noted: 2018-09-19 | Resolved: 2021-07-08

## 2021-07-11 PROBLEM — F41.9 ANXIETY: Status: RESOLVED | Noted: 2018-09-19 | Resolved: 2021-07-08

## 2021-07-13 ENCOUNTER — RECORDS - HEALTHEAST (OUTPATIENT)
Dept: ADMINISTRATIVE | Facility: CLINIC | Age: 56
End: 2021-07-13

## 2021-07-14 PROBLEM — F32.0 MILD MAJOR DEPRESSION (H): Status: RESOLVED | Noted: 2018-09-19 | Resolved: 2021-07-08

## 2021-07-21 ENCOUNTER — RECORDS - HEALTHEAST (OUTPATIENT)
Dept: ADMINISTRATIVE | Facility: CLINIC | Age: 56
End: 2021-07-21

## 2021-07-22 ENCOUNTER — RECORDS - HEALTHEAST (OUTPATIENT)
Dept: LAB | Facility: CLINIC | Age: 56
End: 2021-07-22

## 2021-07-22 DIAGNOSIS — Z12.31 OTHER SCREENING MAMMOGRAM: ICD-10-CM

## 2021-07-23 ENCOUNTER — ANCILLARY PROCEDURE (OUTPATIENT)
Dept: MAMMOGRAPHY | Facility: CLINIC | Age: 56
End: 2021-07-23
Attending: FAMILY MEDICINE
Payer: COMMERCIAL

## 2021-07-23 DIAGNOSIS — Z12.31 VISIT FOR SCREENING MAMMOGRAM: ICD-10-CM

## 2021-07-23 PROCEDURE — 77063 BREAST TOMOSYNTHESIS BI: CPT

## 2021-07-23 PROCEDURE — 77067 SCR MAMMO BI INCL CAD: CPT

## 2021-09-27 ENCOUNTER — OFFICE VISIT (OUTPATIENT)
Dept: FAMILY MEDICINE | Facility: CLINIC | Age: 56
End: 2021-09-27
Payer: COMMERCIAL

## 2021-09-27 VITALS
BODY MASS INDEX: 29.06 KG/M2 | HEART RATE: 70 BPM | SYSTOLIC BLOOD PRESSURE: 122 MMHG | WEIGHT: 164.06 LBS | DIASTOLIC BLOOD PRESSURE: 78 MMHG | OXYGEN SATURATION: 97 %

## 2021-09-27 DIAGNOSIS — Z78.0 POSTMENOPAUSAL STATUS: ICD-10-CM

## 2021-09-27 DIAGNOSIS — Z78.0 ASYMPTOMATIC MENOPAUSAL STATE: ICD-10-CM

## 2021-09-27 DIAGNOSIS — L72.3 SEBACEOUS CYST: Primary | ICD-10-CM

## 2021-09-27 PROCEDURE — 99214 OFFICE O/P EST MOD 30 MIN: CPT | Performed by: FAMILY MEDICINE

## 2021-09-27 RX ORDER — ESTRADIOL 1 MG/1
1 TABLET ORAL DAILY
Qty: 90 TABLET | Refills: 3 | Status: SHIPPED | OUTPATIENT
Start: 2021-09-27 | End: 2022-07-18

## 2021-09-27 RX ORDER — PROGESTERONE 100 MG/1
CAPSULE ORAL
Qty: 90 CAPSULE | Refills: 3 | Status: SHIPPED | OUTPATIENT
Start: 2021-09-27 | End: 2022-09-13

## 2021-09-27 RX ORDER — SPIRONOLACTONE 25 MG/1
25 TABLET ORAL 2 TIMES DAILY
Qty: 180 TABLET | Refills: 0 | Status: SHIPPED | OUTPATIENT
Start: 2021-09-27 | End: 2021-12-20

## 2021-09-27 NOTE — PROGRESS NOTES
ASSESSMENT/PLAN:  Marge was seen today for lump under right armpit.    Diagnoses and all orders for this visit:    Sebaceous cyst, right armpit  -     amoxicillin-clavulanate (AUGMENTIN) 875-125 MG tablet; Take 1 tablet by mouth 2 times daily  -     Adult General Surg Referral; Future    Postmenopausal status  I will give her spironolactone for the thinning of her head hair and for acne on her face  We will refill progesterone and estradiol  Follow-up in 6 months  -     progesterone (PROMETRIUM) 100 MG capsule; TAKE 1 CAPSULE BY MOUTH AT BEDTIME.  -     estradiol (ESTRACE) 1 MG tablet; Take 1 tablet (1 mg) by mouth daily  -     spironolactone (ALDACTONE) 25 MG tablet; Take 1 tablet (25 mg) by mouth 2 times daily    SUBJECTIVE:    Marge Simmons is a 56 year old female who came in today     She has a couple items to discuss today:    1.  For med check   Started on estradiol and progesterone for hormone replacement therapy for fatigue, vaginal dryness, puffiness sensation, brain fog, thinning of hair, acne, overweight  Has been taking estradiol and progesterone for the last 2 months  Less tired  Less mood swings  Less vaginal dryness  Still with thinning of the hair and acne of the face  Denies breast tenderness  Denies vaginal bleeding  At one point she saw a gynecologist and was started on hormone replacement therapy using the cream and patch but she found it to be too expensive to continue.    2.  Right armpit cyst  For the last 2 to 3 days now she has noticed swelling, pain, redness, tenderness of the right armpit.  She had a similar situation about 27 years ago along the same time she was started on hormone therapy.  She saw a surgeon at the time and had incision and drainage.  She does not feel feverish.    Review of Systems (except those mentioned above)  Constitutional: Negative.   HENT: Negative.   Eyes: Negative.   Respiratory: Negative.   Cardiovascular: Negative.   Gastrointestinal: Negative.   Endocrine:  Negative.   Genitourinary: Negative.   Musculoskeletal: Negative.   Skin: Negative.   Allergic/Immunologic: Negative.   Neurological: Negative.   Hematological: Negative.   Psychiatric/Behavioral: Negative.     There are no problems to display for this patient.    Allergies   Allergen Reactions     Azithromycin Unknown     Current Outpatient Medications   Medication Sig Dispense Refill     amoxicillin-clavulanate (AUGMENTIN) 875-125 MG tablet Take 1 tablet by mouth 2 times daily 20 tablet 0     biotin 1,000 mcg Chew [BIOTIN 1,000 MCG CHEW] Chew.       multivitamin with minerals (THERA-M) 9 mg iron-400 mcg Tab tablet [MULTIVITAMIN WITH MINERALS (THERA-M) 9 MG IRON-400 MCG TAB TABLET] Take 1 tablet by mouth daily.       vitamin B complex-folic acid (B COMPLEX 1, WITH FOLIC ACID,) 0.4 mg Tab [VITAMIN B COMPLEX-FOLIC ACID (B COMPLEX 1, WITH FOLIC ACID,) 0.4 MG TAB] Take by mouth.       estradiol (ESTRACE) 1 MG tablet Take 1 tablet (1 mg) by mouth daily 90 tablet 3     progesterone (PROMETRIUM) 100 MG capsule TAKE 1 CAPSULE BY MOUTH AT BEDTIME. 90 capsule 3     spironolactone (ALDACTONE) 25 MG tablet Take 1 tablet (25 mg) by mouth 2 times daily 180 tablet 0     No past medical history on file.  Past Surgical History:   Procedure Laterality Date     [facility]    [facility]       SECTION  2007     DILATION AND CURETTAGE, DIAGNOSTIC / THERAPEUTIC  2008     OOPHORECTOMY Bilateral 2012     OVARIAN CYST REMOVAL  2007     Social History     Socioeconomic History     Marital status:      Spouse name: None     Number of children: None     Years of education: None     Highest education level: None   Occupational History     None   Tobacco Use     Smoking status: Never Smoker     Smokeless tobacco: Never Used   Substance and Sexual Activity     Alcohol use: No     Drug use: None     Sexual activity: None   Other Topics Concern     None   Social History  Narrative     None     Social Determinants of Health     Financial Resource Strain:      Difficulty of Paying Living Expenses:    Food Insecurity:      Worried About Running Out of Food in the Last Year:      Ran Out of Food in the Last Year:    Transportation Needs:      Lack of Transportation (Medical):      Lack of Transportation (Non-Medical):    Physical Activity:      Days of Exercise per Week:      Minutes of Exercise per Session:    Stress:      Feeling of Stress :    Social Connections:      Frequency of Communication with Friends and Family:      Frequency of Social Gatherings with Friends and Family:      Attends Congregational Services:      Active Member of Clubs or Organizations:      Attends Club or Organization Meetings:      Marital Status:    Intimate Partner Violence:      Fear of Current or Ex-Partner:      Emotionally Abused:      Physically Abused:      Sexually Abused:      Family History   Problem Relation Age of Onset     Prostate Cancer Father         stage 1     Colon Cancer Maternal Grandmother 55.00     Parkinsonism Maternal Grandfather      Stomach Cancer Paternal Grandfather 79.00     Tongue cancer Maternal Uncle 60.00         OBJECTIVE:    Vitals:    09/27/21 0944   BP: 122/78   BP Location: Left arm   Patient Position: Sitting   Cuff Size: Adult Regular   Pulse: 70   SpO2: 97%   Weight: 74.4 kg (164 lb 1 oz)     Body mass index is 29.06 kg/m .    Physical Exam:  Constitutional: Patient is oriented to person, place, and time. Patient appears well-developed and well-nourished. No distress.   Head: Normocephalic and atraumatic.   Right Ear: External ear normal.   Left Ear: External ear normal.   Eyes: Conjunctivae and EOM are normal. Right eye exhibits no discharge. Left eye exhibits no discharge. No scleral icterus.   Neurological: Patient is alert and oriented to person, place, and time.  Skin: No rash noted. Patient is not diaphoretic. No erythema. No pallor.  Right armpit: Infected  sebaceous cyst

## 2021-09-29 ENCOUNTER — TELEPHONE (OUTPATIENT)
Dept: FAMILY MEDICINE | Facility: CLINIC | Age: 56
End: 2021-09-29

## 2021-09-29 NOTE — TELEPHONE ENCOUNTER
Take the antibiotic as prescribed.  She can apply warm compress to the area.  She may take Tylenol as needed for the pain.  Do not squeeze the area.  Keep her appointment with the surgeon on October 8, 2021.  If her symptoms worsen before then, she may go to the emergency room.

## 2021-09-29 NOTE — TELEPHONE ENCOUNTER
Called and spoke with  Patient, I gave her Dr. William's message below. She feels this is a little more urgent as she said it's gotten bigger and more uncomfortable. She's got redness and swelling as well as drainage. She does have a consult this afternoon to  Have this looked at and will call back if needed.

## 2021-09-29 NOTE — TELEPHONE ENCOUNTER
She has an infected sebaceous cyst that is why she is on antibiotic.  The surgeon will help with the incision and drainage if they feel it is appropriate.

## 2021-09-29 NOTE — TELEPHONE ENCOUNTER
"9-29-21  Reason for Call:  followup from 9-27-21    Detailed comments: pt called stated her previous doctor used to make a small incision to take care of her issue, pt wants to know Dr Nataliia evans do that?  Pt also wants to know why on order placed on 9-27-21 to general surgery it was put as \"Sebaceous Cyst\" pt wanted to clarify its not a cyst, rather some puss.    Phone Number Patient can be reached at: Cell number on file:    Telephone Information:   Mobile [phone]       Best Time: [phone]    Can we leave a detailed message on this number? YES    Call taken on 9/29/2021 at 10:30 AM by Linda Kwon    "

## 2021-09-29 NOTE — TELEPHONE ENCOUNTER
9-29-21  Pt called back stated he appointment with general surgery is 10-8-21 pt wants to know what she should be doing in the mean time till the 8th?  yoselin

## 2021-10-08 ENCOUNTER — TELEPHONE (OUTPATIENT)
Dept: FAMILY MEDICINE | Facility: CLINIC | Age: 56
End: 2021-10-08

## 2021-10-08 ENCOUNTER — NURSE TRIAGE (OUTPATIENT)
Dept: NURSING | Facility: CLINIC | Age: 56
End: 2021-10-08

## 2021-10-08 NOTE — TELEPHONE ENCOUNTER
Wound in the arm pit has greatly improved    No fever    It is still pink    It doesn't hurt    No longer draining    Finishing antibiotics on Wed    Since it seem like that symptoms are not completely gone recommended that she have this looked at today or tomorrow.     Given information to [facility]    Stephanie Mcclellan RN  [facility] Nurse Advisor  2:09 PM  10/8/2021    COVID 19 Nurse Triage Plan/Patient Instructions    Please be aware that novel coronavirus (COVID-19) may be circulating in the community. If you develop symptoms such as fever, cough, or SOB or if you have concerns about the presence of another infection including coronavirus (COVID-19), please contact your health care provider or visit [email or web address].     Disposition/Instructions    In-Person Visit with provider recommended. Reference Visit Selection Guide.    Thank you for taking steps to prevent the spread of this virus.  o Limit your contact with others.  o Wear a simple mask to cover your cough.  o Wash your hands well and often.    Resources    [facility]: About COVID-19: [email or web address]    CDC: What to Do If You're Sick: www.cdc.gov/coronavirus/2019-ncov/about/steps-when-sick.html    CDC: Ending Home Isolation: www.cdc.gov/coronavirus/2019-ncov/hcp/disposition-in-home-patients.html     CDC: Caring for Someone: www.cdc.gov/coronavirus/2019-ncov/if-you-are-sick/care-for-someone.html     [facility]: Interim Guidance for Hospital Discharge to Home: www.health.[facility]/diseases/coronavirus/hcp/hospdischarge.pdf    [facility] clinical trials (COVID-19 research studies): [email or web address]-clinical-trials     Below are the COVID-19 hotlines at the [place] Department of Health ([facility]). Interpreters are available.   o For health questions: Call 047-977-0018 or 1-101.862.3241 (7 a.m. to 7 p.m.)  o For questions about schools and childcare: Call 873-509-8411 or 1-518.657.3748 (7 a.m. to 7 p.m.)                        Reason for Disposition    Finished taking antibiotics and symptoms are BETTER but are not completely gone    Additional Information    Negative: Severe difficulty breathing (e.g., struggling for each breath, speaks in single words)    Negative: Sounds like a life-threatening emergency to the triager    Negative: MODERATE difficulty breathing (e.g., speaks in phrases, SOB even at rest, pulse 100-120)    Negative: Fever > 103 F (39.4 C)    Negative: Patient sounds very sick or weak to the triager    Protocols used: INFECTION ON ANTIBIOTIC FOLLOW-UP CALL-A-[place]

## 2021-10-16 ENCOUNTER — HEALTH MAINTENANCE LETTER (OUTPATIENT)
Age: 56
End: 2021-10-16

## 2021-10-19 PROBLEM — F32.9 MAJOR DEPRESSION: Status: RESOLVED | Noted: 2018-09-19 | Resolved: 2021-07-08

## 2021-10-21 ENCOUNTER — OFFICE VISIT (OUTPATIENT)
Dept: SURGERY | Facility: CLINIC | Age: 56
End: 2021-10-21
Attending: FAMILY MEDICINE
Payer: COMMERCIAL

## 2021-10-21 DIAGNOSIS — L72.3 SEBACEOUS CYST: ICD-10-CM

## 2021-10-21 PROCEDURE — 99243 OFF/OP CNSLTJ NEW/EST LOW 30: CPT | Performed by: SURGERY

## 2021-10-21 NOTE — LETTER
10/21/2021         RE: Marge Simmons  [address] [address]        Dear Colleague,    Thank you for referring your patient, Marge Simmons, to the [facility] [place]. Please see a copy of my visit note below.    HPI: Marge Simmons is a 56 year old female referred to see me by Perico Viramontes for a right axillary abscess.  She notes several weeks ago development of a raised, swollen, and draining lump in her axilla.  Notes that she had a similar infection/cyst 25 years ago, with nothing there in the interim.   She was started on Augmentin last month to treat this and notes it has improved, but was still having some tenderness and drainage as recently as a few days ago.      Allergies:Azithromycin    Prior Medical History: No past medical history on file.    Prior Surgical History:   Past Surgical History:   Procedure Laterality Date     C EXCIS UTERINE FIBROID,ABD APPRC  1999    Uterine Myomectomy       SECTION  2007     DILATION AND CURETTAGE, DIAGNOSTIC / THERAPEUTIC  2008     OOPHORECTOMY Bilateral 2012     OVARIAN CYST REMOVAL  2007       CURRENT MEDS:  Prior to Admission medications    Medication Sig Start Date End Date Taking? Authorizing Provider   amoxicillin-clavulanate (AUGMENTIN) 875-125 MG tablet Take 1 tablet by mouth 2 times daily 21   Perico Viramontes MD   biotin 1,000 mcg Chew [BIOTIN 1,000 MCG CHEW] Chew. 21   Provider, Historical   estradiol (ESTRACE) 1 MG tablet Take 1 tablet (1 mg) by mouth daily 21   Perico Viramontes MD   multivitamin with minerals (THERA-M) 9 mg iron-400 mcg Tab tablet [MULTIVITAMIN WITH MINERALS (THERA-M) 9 MG IRON-400 MCG TAB TABLET] Take 1 tablet by mouth daily. 10/26/16   Provider, Historical   progesterone (PROMETRIUM) 100 MG capsule TAKE 1 CAPSULE BY MOUTH AT BEDTIME. 21   Perico Viramontes MD   spironolactone  (ALDACTONE) 25 MG tablet Take 1 tablet (25 mg) by mouth 2 times daily 9/27/21   Perico Viramontes MD   vitamin B complex-folic acid (B COMPLEX 1, WITH FOLIC ACID,) 0.4 mg Tab [VITAMIN B COMPLEX-FOLIC ACID (B COMPLEX 1, WITH FOLIC ACID,) 0.4 MG TAB] Take by mouth. 7/8/21   Provider, Historical         Family History   Problem Relation Age of Onset     Prostate Cancer Father         stage 1     Colon Cancer Maternal Grandmother 55.00     Parkinsonism Maternal Grandfather      Stomach Cancer Paternal Grandfather 79.00     Tongue cancer Maternal Uncle 60.00        reports that she has never smoked. She has never used smokeless tobacco. She reports that she does not drink alcohol.    History   Smoking Status     Never Smoker   Smokeless Tobacco     Never Used       Review of Systems -    The 10 point review of systems is within normal limits except as noted in HPI.    There were no vitals taken for this visit.  0 lbs 0 oz  There is no height or weight on file to calculate BMI.    EXAM:  GENERAL: Alert, cooperative, appears stated age  SKIN: 1x2 cm area of raised, fluctuant skin in the right axilla.      LABS:  Lab Results   Component Value Date    HGB 13.7 08/13/2018    WBC 6.2 08/13/2018     08/13/2018    AST 17 07/08/2021    ALT 15 07/08/2021    ALKPHOS 67 07/08/2021    BILITOTAL 0.5 07/08/2021           Assessment/Plan:   1. Sebaceous cyst          Marge Simmons is a 56 year old female with signs and symptoms consistent with an infected sebaceous cyst.  Will need incision and drainage which we can perform here today.  Teo not require additional antibiotics.  .      20 minutes spent on the date of the encounter doing chart review, patient visit and documentation    Moiz Aviles MD ,MD  [facility] Department of Surgery      Again, thank you for allowing me to participate in the care of your patient.        Sincerely,        Moiz Aviles MD

## 2021-10-21 NOTE — PROGRESS NOTES
HPI: Marge Simmons is a 56 year old female referred to see me by Perico Viramontes for a right axillary abscess.  She notes several weeks ago development of a raised, swollen, and draining lump in her axilla.  Notes that she had a similar infection/cyst 25 years ago, with nothing there in the interim.   She was started on Augmentin last month to treat this and notes it has improved, but was still having some tenderness and drainage as recently as a few days ago.      Allergies:Azithromycin    Prior Medical History: No past medical history on file.    Prior Surgical History:   Past Surgical History:   Procedure Laterality Date     [facility]    Uterine Myomectomy       SECTION  2007     DILATION AND CURETTAGE, DIAGNOSTIC / THERAPEUTIC  2008     OOPHORECTOMY Bilateral 2012     OVARIAN CYST REMOVAL  2007       CURRENT MEDS:  Prior to Admission medications    Medication Sig Start Date End Date Taking? Authorizing Provider   amoxicillin-clavulanate (AUGMENTIN) 875-125 MG tablet Take 1 tablet by mouth 2 times daily 21   Perico Viramontes MD   biotin 1,000 mcg Chew [BIOTIN 1,000 MCG CHEW] Chew. 21   Provider, Historical   estradiol (ESTRACE) 1 MG tablet Take 1 tablet (1 mg) by mouth daily 21   Perico Viramontes MD   multivitamin with minerals (THERA-M) 9 mg iron-400 mcg Tab tablet [MULTIVITAMIN WITH MINERALS (THERA-M) 9 MG IRON-400 MCG TAB TABLET] Take 1 tablet by mouth daily. 10/26/16   Provider, Historical   progesterone (PROMETRIUM) 100 MG capsule TAKE 1 CAPSULE BY MOUTH AT BEDTIME. 21   Perico Viramontes MD   spironolactone (ALDACTONE) 25 MG tablet Take 1 tablet (25 mg) by mouth 2 times daily 21   Perico Viramontes MD   vitamin B complex-folic acid (B COMPLEX 1, WITH FOLIC ACID,) 0.4 mg Tab [VITAMIN B COMPLEX-FOLIC ACID (B COMPLEX 1, WITH FOLIC ACID,) 0.4 MG TAB] Take by  mouth. 7/8/21   Provider, Historical         Family History   Problem Relation Age of Onset     Prostate Cancer Father         stage 1     Colon Cancer Maternal Grandmother 55.00     Parkinsonism Maternal Grandfather      Stomach Cancer Paternal Grandfather 79.00     Tongue cancer Maternal Uncle 60.00        reports that she has never smoked. She has never used smokeless tobacco. She reports that she does not drink alcohol.    History   Smoking Status     Never Smoker   Smokeless Tobacco     Never Used       Review of Systems -    The 10 point review of systems is within normal limits except as noted in HPI.    There were no vitals taken for this visit.  0 lbs 0 oz  There is no height or weight on file to calculate BMI.    EXAM:  GENERAL: Alert, cooperative, appears stated age  SKIN: 1x2 cm area of raised, fluctuant skin in the right axilla.      LABS:  Lab Results   Component Value Date    HGB 13.7 08/13/2018    WBC 6.2 08/13/2018     08/13/2018    AST 17 07/08/2021    ALT 15 07/08/2021    ALKPHOS 67 07/08/2021    BILITOTAL 0.5 07/08/2021           Assessment/Plan:   1. Sebaceous cyst          Marge Simmons is a 56 year old female with signs and symptoms consistent with an infected sebaceous cyst.  Will need incision and drainage which we can perform here today.  Teo not require additional antibiotics.  .      20 minutes spent on the date of the encounter doing chart review, patient visit and documentation    Moiz Aviles MD ,MD  [facility] Department of Surgery

## 2021-10-21 NOTE — PROCEDURES
The skin over the right axillary abscess was prepped with alcohol.  We then proceeded to inject 5 cc of 1% lidocaine.  A 1.5 cm incision was made directly over the fluctuant area, with pus and sebaceous cyst keratinized material expressed.  The wound was then probed, tunneling approximately 2 cm proximally.  After ensuring most of the contents were expressed, the wound was packed with quarter inch Nu Gauze.    Moiz Aviles MD, FACS  [phone]  [facility]  General and Bariatric Surgery

## 2021-11-03 ENCOUNTER — TELEPHONE (OUTPATIENT)
Dept: FAMILY MEDICINE | Facility: CLINIC | Age: 56
End: 2021-11-03
Payer: COMMERCIAL

## 2021-11-03 NOTE — TELEPHONE ENCOUNTER
Patient dropped off form to be completed.    Please fax to [phone] when done.      Routed to  core.

## 2021-12-18 DIAGNOSIS — Z78.0 POSTMENOPAUSAL STATUS: ICD-10-CM

## 2021-12-20 RX ORDER — SPIRONOLACTONE 25 MG/1
TABLET ORAL
Qty: 180 TABLET | Refills: 0 | Status: SHIPPED | OUTPATIENT
Start: 2021-12-20 | End: 2022-03-22

## 2021-12-20 NOTE — TELEPHONE ENCOUNTER
"Last Written Prescription Date:  09/27/2021  Last Fill Quantity: 180,  # refills: 0   Last office visit provider:  09/27/2021 with PCP.    Requested Prescriptions   Pending Prescriptions Disp Refills     spironolactone (ALDACTONE) 25 MG tablet [Pharmacy Med Name: SPIRONOLACTONE 25 MG TABLET] 180 tablet 0     Sig: TAKE 1 TABLET BY MOUTH TWICE A DAY       Diuretics (Including Combos) Protocol Passed - 12/18/2021  7:10 AM        Passed - Blood pressure under 140/90 in past 12 months     BP Readings from Last 3 Encounters:   09/27/21 122/78   07/08/21 104/72   12/23/19 112/70                 Passed - Recent (12 mo) or future (30 days) visit within the authorizing provider's specialty     Patient has had an office visit with the authorizing provider or a provider within the authorizing providers department within the previous 12 mos or has a future within next 30 days. See \"Patient Info\" tab in inbasket, or \"Choose Columns\" in Meds & Orders section of the refill encounter.              Passed - Medication is active on med list        Passed - Patient is age 18 or older        Passed - No active pregancy on record        Passed - Normal serum creatinine on file in past 12 months     Recent Labs   Lab Test 07/08/21  0858   CR 0.66              Passed - Normal serum potassium on file in past 12 months     Recent Labs   Lab Test 07/08/21  0858   POTASSIUM 4.1                    Passed - Normal serum sodium on file in past 12 months     Recent Labs   Lab Test 07/08/21  0858                 Passed - No positive pregnancy test in past 12 months             Cheyanne Chandler 12/20/21 3:44 PM  "

## 2022-01-25 ENCOUNTER — TRANSFERRED RECORDS (OUTPATIENT)
Dept: HEALTH INFORMATION MANAGEMENT | Facility: CLINIC | Age: 57
End: 2022-01-25
Payer: COMMERCIAL

## 2022-03-21 DIAGNOSIS — Z78.0 POSTMENOPAUSAL STATUS: ICD-10-CM

## 2022-03-22 RX ORDER — SPIRONOLACTONE 25 MG/1
TABLET ORAL
Qty: 180 TABLET | Refills: 2 | Status: SHIPPED | OUTPATIENT
Start: 2022-03-22 | End: 2022-09-13

## 2022-03-22 NOTE — TELEPHONE ENCOUNTER
"Last Written Prescription Date:  12/20/21  Last Fill Quantity: 180,  # refills: 0   Last office visit provider:  9/27/21     Requested Prescriptions   Pending Prescriptions Disp Refills     spironolactone (ALDACTONE) 25 MG tablet [Pharmacy Med Name: SPIRONOLACTONE 25 MG TABLET] 180 tablet 0     Sig: TAKE 1 TABLET BY MOUTH TWICE A DAY       Diuretics (Including Combos) Protocol Passed - 3/22/2022  8:59 AM        Passed - Blood pressure under 140/90 in past 12 months     BP Readings from Last 3 Encounters:   09/27/21 122/78   07/08/21 104/72   12/23/19 112/70                 Passed - Recent (12 mo) or future (30 days) visit within the authorizing provider's specialty     Patient has had an office visit with the authorizing provider or a provider within the authorizing providers department within the previous 12 mos or has a future within next 30 days. See \"Patient Info\" tab in inbasket, or \"Choose Columns\" in Meds & Orders section of the refill encounter.              Passed - Medication is active on med list        Passed - Patient is age 18 or older        Passed - No active pregancy on record        Passed - Normal serum creatinine on file in past 12 months     Recent Labs   Lab Test 07/08/21  0858   CR 0.66              Passed - Normal serum potassium on file in past 12 months     Recent Labs   Lab Test 07/08/21  0858   POTASSIUM 4.1                    Passed - Normal serum sodium on file in past 12 months     Recent Labs   Lab Test 07/08/21  0858                 Passed - No positive pregnancy test in past 12 months             Herrera Diamond RN 03/22/22 9:00 AM  "

## 2022-05-09 ENCOUNTER — NURSE TRIAGE (OUTPATIENT)
Dept: NURSING | Facility: CLINIC | Age: 57
End: 2022-05-09
Payer: COMMERCIAL

## 2022-05-09 NOTE — TELEPHONE ENCOUNTER
Reason for Disposition    Cough has been present for > 3 weeks    Additional Information    Negative: Bluish (or gray) lips or face    Negative: Severe difficulty breathing (e.g., struggling for each breath, speaks in single words)    Negative: Rapid onset of cough and has hives    Negative: Coughing started suddenly after medicine, an allergic food or bee sting    Negative: Difficulty breathing after exposure to flames, smoke, or fumes    Negative: Sounds like a life-threatening emergency to the triager    Negative: Previous asthma attacks and this feels like asthma attack    Negative: Chest pain present when not coughing    Negative: Difficulty breathing    Negative: Passed out (i.e., fainted, collapsed and was not responding)    Negative: Patient sounds very sick or weak to the triager    Negative: Coughed up > 1 tablespoon (15 ml) blood (Exception: blood-tinged sputum)    Negative: Fever > 103 F (39.4 C)    Negative: Fever > 101 F (38.3 C) and over 60 years of age    Negative: Fever > 100.0 F (37.8 C) and has diabetes mellitus or a weak immune system (e.g., HIV positive, cancer chemotherapy, organ transplant, splenectomy, chronic steroids)    Negative: Fever > 100.0 F (37.8 C) and bedridden (e.g., nursing home patient, stroke, chronic illness, recovering from surgery)    Negative: Increasing ankle swelling    Negative: Wheezing is present    Negative: SEVERE coughing spells (e.g., whooping sound after coughing, vomiting after coughing)    Negative: Coughing up gabriela-colored (reddish-brown) or blood-tinged sputum    Negative: Fever present > 3 days (72 hours)    Negative: Fever returns after gone for over 24 hours and symptoms worse or not improved    Negative: Using nasal washes and pain medicine > 24 hours and sinus pain persists    Negative: Known COPD or other severe lung disease (i.e., bronchiectasis, cystic fibrosis, lung surgery) and worsening symptoms (i.e., increased sputum purulence or amount,  "increased breathing difficulty)    Negative: Continuous (nonstop) coughing interferes with work or school and no improvement using cough treatment per Care Advice    Negative: Patient wants to be seen    Answer Assessment - Initial Assessment Questions  1. ONSET: \"When did the cough begin?\"       More than a month ago  2. SEVERITY: \"How bad is the cough today?\"       Annoying comes and goes  3. RESPIRATORY DISTRESS: \"Describe your breathing.\"       no  4. FEVER: \"Do you have a fever?\" If so, ask: \"What is your temperature, how was it measured, and when did it start?\"      no  5. HEMOPTYSIS: \"Are you coughing up any blood?\" If so ask: \"How much?\" (flecks, streaks, tablespoons, etc.)      no  6. TREATMENT: \"What have you done so far to treat the cough?\" (e.g., meds, fluids, humidifier)      Nothing except allergy meds  7. CARDIAC HISTORY: \"Do you have any history of heart disease?\" (e.g., heart attack, congestive heart failure)       no  8. LUNG HISTORY: \"Do you have any history of lung disease?\"  (e.g., pulmonary embolus, asthma, emphysema)      no  9. PE RISK FACTORS: \"Do you have a history of blood clots?\" (or: recent major surgery, recent prolonged travel, bedridden)      no  10. OTHER SYMPTOMS: \"Do you have any other symptoms? (e.g., runny nose, wheezing, chest pain)        no  11. PREGNANCY: \"Is there any chance you are pregnant?\" \"When was your last menstrual period?\"        no  12. TRAVEL: \"Have you traveled out of the country in the last month?\" (e.g., travel history, exposures)        no    Protocols used: COUGH-A-[place]      "

## 2022-05-23 ASSESSMENT — PATIENT HEALTH QUESTIONNAIRE - PHQ9
10. IF YOU CHECKED OFF ANY PROBLEMS, HOW DIFFICULT HAVE THESE PROBLEMS MADE IT FOR YOU TO DO YOUR WORK, TAKE CARE OF THINGS AT HOME, OR GET ALONG WITH OTHER PEOPLE: NOT DIFFICULT AT ALL
SUM OF ALL RESPONSES TO PHQ QUESTIONS 1-9: 1
SUM OF ALL RESPONSES TO PHQ QUESTIONS 1-9: 1

## 2022-05-24 ENCOUNTER — ANCILLARY PROCEDURE (OUTPATIENT)
Dept: GENERAL RADIOLOGY | Facility: CLINIC | Age: 57
End: 2022-05-24
Attending: FAMILY MEDICINE
Payer: COMMERCIAL

## 2022-05-24 ENCOUNTER — OFFICE VISIT (OUTPATIENT)
Dept: FAMILY MEDICINE | Facility: CLINIC | Age: 57
End: 2022-05-24

## 2022-05-24 VITALS
DIASTOLIC BLOOD PRESSURE: 74 MMHG | BODY MASS INDEX: 27.69 KG/M2 | HEART RATE: 68 BPM | WEIGHT: 156.3 LBS | SYSTOLIC BLOOD PRESSURE: 102 MMHG | OXYGEN SATURATION: 98 %

## 2022-05-24 DIAGNOSIS — R05.9 COUGH: ICD-10-CM

## 2022-05-24 DIAGNOSIS — R05.9 COUGH: Primary | ICD-10-CM

## 2022-05-24 PROCEDURE — 99214 OFFICE O/P EST MOD 30 MIN: CPT | Performed by: FAMILY MEDICINE

## 2022-05-24 PROCEDURE — 71046 X-RAY EXAM CHEST 2 VIEWS: CPT | Mod: TC | Performed by: RADIOLOGY

## 2022-05-24 RX ORDER — BENZONATATE 200 MG/1
200 CAPSULE ORAL 3 TIMES DAILY PRN
Qty: 30 CAPSULE | Refills: 1 | Status: SHIPPED | OUTPATIENT
Start: 2022-05-24 | End: 2022-09-13

## 2022-05-24 RX ORDER — FLUTICASONE PROPIONATE 50 MCG
SPRAY, SUSPENSION (ML) NASAL
COMMUNITY
Start: 2022-05-09 | End: 2022-09-13

## 2022-05-24 RX ORDER — METHYLPREDNISOLONE 4 MG
TABLET, DOSE PACK ORAL
Qty: 21 TABLET | Refills: 0 | Status: SHIPPED | OUTPATIENT
Start: 2022-05-24 | End: 2022-09-13

## 2022-05-24 RX ORDER — LORATADINE 10 MG/1
10 TABLET ORAL DAILY
COMMUNITY
End: 2022-09-13

## 2022-05-24 NOTE — PATIENT INSTRUCTIONS
Daily claritin 10mg  Prilosec 20mg in the morning on an empty stomach  Call in 1 month if the cough is still not better

## 2022-07-18 DIAGNOSIS — Z78.0 POSTMENOPAUSAL STATUS: ICD-10-CM

## 2022-07-18 RX ORDER — ESTRADIOL 1 MG/1
TABLET ORAL
Qty: 90 TABLET | Refills: 2 | Status: SHIPPED | OUTPATIENT
Start: 2022-07-18 | End: 2022-09-13

## 2022-07-19 NOTE — TELEPHONE ENCOUNTER
"Last Written Prescription Date:  9/27/21  Last Fill Quantity: 90,  # refills: 3   Last office visit provider:  5/24/22     Requested Prescriptions   Pending Prescriptions Disp Refills     estradiol (ESTRACE) 1 MG tablet [Pharmacy Med Name: ESTRADIOL 1 MG TABLET] 90 tablet 3     Sig: TAKE 1 TABLET BY MOUTH EVERY DAY       Hormone Replacement Therapy Passed - 7/18/2022  1:21 PM        Passed - Blood pressure under 140/90 in past 12 months     BP Readings from Last 3 Encounters:   05/24/22 102/74   09/27/21 122/78   07/08/21 104/72                 Passed - Recent (12 mo) or future (30 days) visit within the authorizing provider's specialty     Patient has had an office visit with the authorizing provider or a provider within the authorizing providers department within the previous 12 mos or has a future within next 30 days. See \"Patient Info\" tab in inbasket, or \"Choose Columns\" in Meds & Orders section of the refill encounter.              Passed - Patient has mammogram in past 2 years on file if age 50-75        Passed - Medication is active on med list        Passed - Patient is 18 years of age or older        Passed - No active pregnancy on record        Passed - No positive pregnancy test on record in past 12 months             Marianela Mckeon RN 07/18/22 7:16 PM  "

## 2022-08-23 ENCOUNTER — HOSPITAL ENCOUNTER (OUTPATIENT)
Dept: MAMMOGRAPHY | Facility: CLINIC | Age: 57
Discharge: HOME | End: 2022-08-23
Attending: FAMILY MEDICINE | Admitting: FAMILY MEDICINE
Payer: COMMERCIAL

## 2022-08-23 DIAGNOSIS — Z12.31 VISIT FOR SCREENING MAMMOGRAM: ICD-10-CM

## 2022-08-23 PROCEDURE — 77067 SCR MAMMO BI INCL CAD: CPT

## 2022-09-12 ASSESSMENT — ENCOUNTER SYMPTOMS
DIZZINESS: 0
SORE THROAT: 0
PALPITATIONS: 0
NAUSEA: 0
FREQUENCY: 0
BREAST MASS: 0
PARESTHESIAS: 0
FEVER: 0
DIARRHEA: 0
HEMATURIA: 0
CHILLS: 0
WEAKNESS: 0
EYE PAIN: 0
ABDOMINAL PAIN: 0
MYALGIAS: 0
JOINT SWELLING: 0
HEARTBURN: 0
DYSURIA: 0
ARTHRALGIAS: 0
HEMATOCHEZIA: 0
HEADACHES: 0
SHORTNESS OF BREATH: 0
CONSTIPATION: 0
NERVOUS/ANXIOUS: 0
COUGH: 0

## 2022-09-13 ENCOUNTER — OFFICE VISIT (OUTPATIENT)
Dept: FAMILY MEDICINE | Facility: CLINIC | Age: 57
End: 2022-09-13
Payer: COMMERCIAL

## 2022-09-13 VITALS
DIASTOLIC BLOOD PRESSURE: 62 MMHG | HEIGHT: 63 IN | BODY MASS INDEX: 28.07 KG/M2 | WEIGHT: 158.44 LBS | HEART RATE: 90 BPM | OXYGEN SATURATION: 95 % | SYSTOLIC BLOOD PRESSURE: 118 MMHG

## 2022-09-13 DIAGNOSIS — L65.9 HAIR LOSS: ICD-10-CM

## 2022-09-13 DIAGNOSIS — Z78.0 POSTMENOPAUSAL STATUS: ICD-10-CM

## 2022-09-13 DIAGNOSIS — Z00.00 ROUTINE ADULT HEALTH MAINTENANCE: Primary | ICD-10-CM

## 2022-09-13 LAB
ALBUMIN SERPL BCG-MCNC: 4.7 G/DL (ref 3.5–5.2)
ALP SERPL-CCNC: 59 U/L (ref 35–104)
ALT SERPL W P-5'-P-CCNC: 20 U/L (ref 10–35)
ANION GAP SERPL CALCULATED.3IONS-SCNC: 7 MMOL/L (ref 7–15)
AST SERPL W P-5'-P-CCNC: 24 U/L (ref 10–35)
BILIRUB SERPL-MCNC: 0.6 MG/DL
BUN SERPL-MCNC: 11.9 MG/DL (ref 6–20)
CALCIUM SERPL-MCNC: 9.7 MG/DL (ref 8.6–10)
CHLORIDE SERPL-SCNC: 103 MMOL/L (ref 98–107)
CHOLEST SERPL-MCNC: 188 MG/DL
CREAT SERPL-MCNC: 0.69 MG/DL (ref 0.51–0.95)
DEPRECATED HCO3 PLAS-SCNC: 29 MMOL/L (ref 22–29)
GFR SERPL CREATININE-BSD FRML MDRD: >90 ML/MIN/1.73M2
GLUCOSE SERPL-MCNC: 88 MG/DL (ref 70–99)
HDLC SERPL-MCNC: 53 MG/DL
LDLC SERPL CALC-MCNC: 125 MG/DL
NONHDLC SERPL-MCNC: 135 MG/DL
POTASSIUM SERPL-SCNC: 4.5 MMOL/L (ref 3.4–5.3)
PROT SERPL-MCNC: 7.5 G/DL (ref 6.4–8.3)
SODIUM SERPL-SCNC: 139 MMOL/L (ref 136–145)
TRIGL SERPL-MCNC: 49 MG/DL

## 2022-09-13 PROCEDURE — 80053 COMPREHEN METABOLIC PANEL: CPT | Performed by: FAMILY MEDICINE

## 2022-09-13 PROCEDURE — 99396 PREV VISIT EST AGE 40-64: CPT | Performed by: FAMILY MEDICINE

## 2022-09-13 PROCEDURE — 36415 COLL VENOUS BLD VENIPUNCTURE: CPT | Performed by: FAMILY MEDICINE

## 2022-09-13 PROCEDURE — 99213 OFFICE O/P EST LOW 20 MIN: CPT | Mod: 25 | Performed by: FAMILY MEDICINE

## 2022-09-13 PROCEDURE — 80061 LIPID PANEL: CPT | Performed by: FAMILY MEDICINE

## 2022-09-13 RX ORDER — PROGESTERONE 100 MG/1
CAPSULE ORAL
Qty: 90 CAPSULE | Refills: 3 | Status: SHIPPED | OUTPATIENT
Start: 2022-09-13 | End: 2023-10-11

## 2022-09-13 RX ORDER — SPIRONOLACTONE 25 MG/1
TABLET ORAL
Qty: 180 TABLET | Refills: 3 | Status: SHIPPED | OUTPATIENT
Start: 2022-09-13 | End: 2023-10-11

## 2022-09-13 RX ORDER — ESTRADIOL 1 MG/1
1 TABLET ORAL DAILY
Qty: 90 TABLET | Refills: 3 | Status: SHIPPED | OUTPATIENT
Start: 2022-09-13 | End: 2023-09-28

## 2022-09-13 ASSESSMENT — ENCOUNTER SYMPTOMS
COUGH: 0
BREAST MASS: 0
NERVOUS/ANXIOUS: 0
PARESTHESIAS: 0
FEVER: 0
DYSURIA: 0
DIZZINESS: 0
CONSTIPATION: 0
ARTHRALGIAS: 0
NAUSEA: 0
HEARTBURN: 0
MYALGIAS: 0
DIARRHEA: 0
ABDOMINAL PAIN: 0
SHORTNESS OF BREATH: 0
HEMATURIA: 0
WEAKNESS: 0
SORE THROAT: 0
PALPITATIONS: 0
FREQUENCY: 0
EYE PAIN: 0
HEADACHES: 0
JOINT SWELLING: 0
HEMATOCHEZIA: 0
CHILLS: 0

## 2022-09-13 NOTE — PROGRESS NOTES
SUBJECTIVE:   CC: Marge Simmons is an 57 year old woman who presents for preventive health visit.       Patient has been advised of split billing requirements and indicates understanding: Yes  Healthy Habits:     Getting at least 3 servings of Calcium per day:  Yes    Bi-annual eye exam:  Yes    Dental care twice a year:  Yes    Sleep apnea or symptoms of sleep apnea:  None    Diet:  Regular (no restrictions)    Frequency of exercise:  2-3 days/week    Duration of exercise:  30-45 minutes    Taking medications regularly:  Yes    Medication side effects:  Not applicable    PHQ-2 Total Score: 0    Additional concerns today:  Yes    Ability to successfully perform activities of daily living: Yes, no assistance needed  Home safety:  none identified     Today's PHQ-2 Score:   PHQ-2 ( 1999 [employer]) 9/12/2022   Q1: Little interest or pleasure in doing things 0   Q2: Feeling down, depressed or hopeless 0   PHQ-2 Score 0   Q1: Little interest or pleasure in doing things Several days   Q2: Feeling down, depressed or hopeless Not at all   PHQ-2 Score 1       Abuse: Current or Past (Physical, Sexual or Emotional) - No  Do you feel safe in your environment? Yes    Have you ever done Advance Care Planning? (For example, a Health Directive, POLST, or a discussion with a medical provider or your loved ones about your wishes): No, advance care planning information given to patient to review.  Patient declined advance care planning discussion at this time.    Social History     Tobacco Use     Smoking status: Never Smoker     Smokeless tobacco: Never Used   Substance Use Topics     Alcohol use: No         Alcohol Use 9/12/2022   Prescreen: >3 drinks/day or >7 drinks/week? Not Applicable     Reviewed orders with patient.  Reviewed health maintenance and updated orders accordingly - Yes  Lab work is in process    Breast Cancer Screening:    Breast CA Risk Assessment (FHS-7) 9/12/2022   Do you have a family history of breast, colon, or  ovarian cancer? No / Unknown         Mammogram Screening: Recommended mammography every 1-2 years with patient discussion and risk factor consideration  Pertinent mammograms are reviewed under the imaging tab.    History of abnormal Pap smear: NO - age 30-65 PAP every 5 years with negative HPV co-testing recommended  PAP / HPV Latest Ref Rng & Units 2018   PAP - Negative for squamous intraepithelial lesion or malignancy  Electronically signed by Corrina Martines CT (ASCP) on 2018 at  4:16 PM     HPV16 NEG Negative   HPV18 NEG Negative   HRHPV NEG Negative     Reviewed and updated as needed this visit by clinical staff   Tobacco  Allergies  Meds  Problems               Reviewed and updated as needed this visit by Provider     Meds  Problems              No past medical history on file.   Past Surgical History:   Procedure Laterality Date      SECTION  2007     DILATION AND CURETTAGE, DIAGNOSTIC / THERAPEUTIC  2008     OOPHORECTOMY Bilateral 2012     OVARIAN CYST REMOVAL  2007     ZZC EXCIS UTERINE FIBROID,ABD APPRCH  1999    Uterine Myomectomy        Review of Systems   Constitutional: Negative for chills and fever.   HENT: Negative for congestion, ear pain, hearing loss and sore throat.    Eyes: Positive for visual disturbance. Negative for pain.   Respiratory: Negative for cough and shortness of breath.    Cardiovascular: Negative for chest pain, palpitations and peripheral edema.   Gastrointestinal: Negative for abdominal pain, constipation, diarrhea, heartburn, hematochezia and nausea.   Breasts:  Negative for tenderness, breast mass and discharge.   Genitourinary: Negative for dysuria, frequency, genital sores, hematuria, pelvic pain, urgency, vaginal bleeding and vaginal discharge.   Musculoskeletal: Negative for arthralgias, joint swelling and myalgias.   Skin: Negative for rash.   Neurological: Negative for dizziness, weakness, headaches and  "paresthesias.   Psychiatric/Behavioral: Negative for mood changes. The patient is not nervous/anxious.       OBJECTIVE:   /62 (BP Location: Left arm, Patient Position: Sitting, Cuff Size: Adult Regular)   Pulse 90   Ht 1.6 m (5' 3\")   Wt 71.9 kg (158 lb 7 oz)   SpO2 95%   BMI 28.07 kg/m    Physical Exam  GENERAL APPEARANCE: healthy, alert and no distress  EYES: Eyes grossly normal to inspection, PERRL and conjunctivae and sclerae normal  HENT: ear canals and TM's normal  NECK: no adenopathy, no asymmetry, masses, or scars and thyroid normal to palpation  RESP: lungs clear to auscultation - no rales, rhonchi or wheezes  BREAST: normal without masses, tenderness or nipple discharge and no palpable axillary masses or adenopathy  CV: regular rate and rhythm, normal S1 S2, no S3 or S4, no murmur, click or rub, no peripheral edema and peripheral pulses strong  ABDOMEN: soft, nontender, no hepatosplenomegaly, no masses and bowel sounds normal  MS: no musculoskeletal defects are noted and gait is age appropriate without ataxia  SKIN: no suspicious lesions or rashes  NEURO: Normal strength and tone, sensory exam grossly normal, mentation intact and speech normal  PSYCH: mentation appears normal and affect normal/bright    Diagnostic Test Results:  Labs reviewed in Epic    ASSESSMENT/PLAN:   Marge was seen today for physical.    Diagnoses and all orders for this visit:    Routine adult health maintenance  -     REVIEW OF HEALTH MAINTENANCE PROTOCOL ORDERS  -     Lipid Profile (Chol, Trig, HDL, LDL calc); Future  -     Comprehensive metabolic panel (BMP + Alb, Alk Phos, ALT, AST, Total. Bili, TP); Future  We discussed healthy lifestyle, nutrition, cardiovascular risk reduction, self care, safety, sunscreen, and timing of cancer screening.  Health maintenance screening and immunizations reviewed with the patient.  Follow up yearly for the annual physical.     Postmenopausal status  refilled  Started on estradiol and " "progesterone for hormone replacement therapy for fatigue, vaginal dryness, puffiness sensation, brain fog, thinning of hair, acne, overweight  Less tired  Less mood swings  Less vaginal dryness  Follow-up yearly  -     progesterone (PROMETRIUM) 100 MG capsule; TAKE 1 CAPSULE BY MOUTH AT BEDTIME.  -     estradiol (ESTRACE) 1 MG tablet; Take 1 tablet (1 mg) by mouth daily    Hair loss, acne of the face  Refilled  Follow-up yearly  -     spironolactone (ALDACTONE) 25 MG tablet; TAKE 1 TABLET BY MOUTH TWICE A DAY      COUNSELING:  Reviewed preventive health counseling, as reflected in patient instructions    Estimated body mass index is 28.07 kg/m  as calculated from the following:    Height as of this encounter: 1.6 m (5' 3\").    Weight as of this encounter: 71.9 kg (158 lb 7 oz).    Weight management plan: Discussed healthy diet and exercise guidelines    She reports that she has never smoked. She has never used smokeless tobacco.      Counseling Resources:  ATP IV Guidelines  Pooled Cohorts Equation Calculator  Breast Cancer Risk Calculator  BRCA-Related Cancer Risk Assessment: FHS-7 Tool  FRAX Risk Assessment  ICSI Preventive Guidelines  Dietary Guidelines for Americans, 2010  USDA's MyPlate  ASA Prophylaxis  Lung CA Screening    Perico Ludwig MD  [facility]  "

## 2022-10-01 ENCOUNTER — HEALTH MAINTENANCE LETTER (OUTPATIENT)
Age: 57
End: 2022-10-01

## 2022-12-27 ENCOUNTER — OFFICE VISIT (OUTPATIENT)
Dept: FAMILY MEDICINE | Facility: CLINIC | Age: 57
End: 2022-12-27
Payer: COMMERCIAL

## 2022-12-27 VITALS
WEIGHT: 159 LBS | OXYGEN SATURATION: 100 % | DIASTOLIC BLOOD PRESSURE: 80 MMHG | TEMPERATURE: 96.8 F | SYSTOLIC BLOOD PRESSURE: 120 MMHG | BODY MASS INDEX: 28.17 KG/M2 | HEART RATE: 75 BPM

## 2022-12-27 DIAGNOSIS — L72.3 SEBACEOUS CYST: Primary | ICD-10-CM

## 2022-12-27 PROCEDURE — 99213 OFFICE O/P EST LOW 20 MIN: CPT | Performed by: FAMILY MEDICINE

## 2022-12-27 NOTE — PROGRESS NOTES
Assessment & Plan     Sebaceous cyst  Non infected small right axillary sebaceous cyst which is likely a regrowth from prior removal 1 year ago given same location. No abx indicated; pt would like to consider definitive removal given it's not infected this time and will see surgery back for this.   - Adult General Surg Referral; Future    Reviewed notes from Dr. William and Dr. Aviles in 9/2021 and 10/2021 regarding this issue                 Return in about 9 months (around 9/27/2023) for Annual physical and establish care with open panel provider.    Stephanie Blackmon MD  [facility] CHARLEY Bird is a 57 year old, presenting for the following health issues:  Mass (Under right arm - was treated a year ago but reappeared ; never got the final answer as to what it was exactly )    Right arm: 1 month of a small bump under her armpit in area of prior sebaceous cyst removal;  this area used to be completely flat per pt after the surgery last year      History of right axillary abscess/sebaceous cyst which was removed by general surgery 10/21/2021.  No hx of diabetes  Has been on estrogen for 1.5 years.    She reports being on Lupron to shrink fibroid in 1990s and in that process noted she was getting hot flashes but also the axillary sweat gland was clogged and had scar tissue there and an infection at that time 25+ yrs ago.     Mass    History of Present Illness       Reason for visit:  Small bump/ lump under right arm which was treated and removed at the general surgery center  [facility] a year ago    She eats 2-3 servings of fruits and vegetables daily.She consumes 0 sweetened beverage(s) daily.She exercises with enough effort to increase her heart rate 20 to 29 minutes per day.  She exercises with enough effort to increase her heart rate 3 or less days per week.   She is taking medications regularly.             Review of Systems   Constitutional, HEENT,  cardiovascular, pulmonary, gi and gu systems are negative, except as otherwise noted.      Objective    /80 (BP Location: Left arm, Patient Position: Sitting, Cuff Size: Adult Regular)   Pulse 75   Temp 96.8  F (36  C) (Temporal)   Wt 72.1 kg (159 lb)   SpO2 100%   BMI 28.17 kg/m    Body mass index is 28.17 kg/m .  Physical Exam   GENERAL: healthy, alert and no distress  SKIN: pea sized skin colored firm lump at right axilla in area of prior cyst removal/scar tissue; this area used to be completely flat per pt after the surgery last year; no erythema or tenderness nor fluctuance

## 2023-01-05 ENCOUNTER — OFFICE VISIT (OUTPATIENT)
Dept: SURGERY | Facility: CLINIC | Age: 58
End: 2023-01-05
Attending: FAMILY MEDICINE
Payer: COMMERCIAL

## 2023-01-05 VITALS
HEIGHT: 63 IN | DIASTOLIC BLOOD PRESSURE: 74 MMHG | WEIGHT: 164 LBS | BODY MASS INDEX: 29.06 KG/M2 | SYSTOLIC BLOOD PRESSURE: 120 MMHG

## 2023-01-05 DIAGNOSIS — L72.3 SEBACEOUS CYST: ICD-10-CM

## 2023-01-05 PROCEDURE — 99212 OFFICE O/P EST SF 10 MIN: CPT | Performed by: SURGERY

## 2023-01-05 NOTE — LETTER
2023         RE: Marge Simmons  [address] [address]        Dear Colleague,    Thank you for referring your patient, Marge Simmons, to the [facility] SURGERY CLINIC AND [facility]ATRICS CARE [place]. Please see a copy of my visit note below.    HPI: Marge Simmons is a 56 year old female referred to see me by Perico Viramontes for a right axillary nodule.  She is known to me from undergoing an abscess I&D at this location last year.  Notes that she has been aware of a small firm cutaneous lump just above her incision and would like to have this ultimately removed.    Allergies:Azithromycin    Prior Medical History: No past medical history on file.    Prior Surgical History:   Past Surgical History:   Procedure Laterality Date      SECTION  2007     DILATION AND CURETTAGE, DIAGNOSTIC / THERAPEUTIC  2008     OOPHORECTOMY Bilateral 2012     OVARIAN CYST REMOVAL  2007     ZZC EXCIS UTERINE FIBROID,ABD APPRCH  1999    Uterine Myomectomy        CURRENT MEDS:  Prior to Admission medications    Medication Sig Start Date End Date Taking? Authorizing Provider   amoxicillin-clavulanate (AUGMENTIN) 875-125 MG tablet Take 1 tablet by mouth 2 times daily 21   Perico Viramontes MD   biotin 1,000 mcg Chew [BIOTIN 1,000 MCG CHEW] Chew. 21   Provider, Historical   estradiol (ESTRACE) 1 MG tablet Take 1 tablet (1 mg) by mouth daily 21   Perico Viramontes MD   multivitamin with minerals (THERA-M) 9 mg iron-400 mcg Tab tablet [MULTIVITAMIN WITH MINERALS (THERA-M) 9 MG IRON-400 MCG TAB TABLET] Take 1 tablet by mouth daily. 10/26/16   Provider, Historical   progesterone (PROMETRIUM) 100 MG capsule TAKE 1 CAPSULE BY MOUTH AT BEDTIME. 21   Perico Viramontes MD   spironolactone (ALDACTONE) 25 MG tablet Take 1 tablet (25 mg) by mouth 2 times daily 21   Perico Viramontes MD   vitamin B complex-folic acid  "(B COMPLEX 1, WITH FOLIC ACID,) 0.4 mg Tab [VITAMIN B COMPLEX-FOLIC ACID (B COMPLEX 1, WITH FOLIC ACID,) 0.4 MG TAB] Take by mouth. 7/8/21   Provider, Historical         Family History   Problem Relation Age of Onset     Prostate Cancer Father         stage 1     Colon Cancer Maternal Grandmother 55.00     Parkinsonism Maternal Grandfather      Stomach Cancer Paternal Grandfather 79.00     Tongue cancer Maternal Uncle 60.00        reports that she has never smoked. She has never used smokeless tobacco. She reports that she does not drink alcohol.    History   Smoking Status     Never   Smokeless Tobacco     Never       Review of Systems -    The 10 point review of systems is within normal limits except as noted in HPI.    /74   Ht 1.6 m (5' 3\")   Wt 74.4 kg (164 lb)   BMI 29.05 kg/m    164 lbs 0 oz  Body mass index is 29.05 kg/m .    EXAM:  GENERAL: Alert, cooperative, appears stated age  SKIN: 5 mm area of firm skin with slight discoloration, appears more superficial than I would expect for sebaceous cyst and may reflect an underlying ingrown hair or trapped epidermal tissue.    LABS:  Lab Results   Component Value Date    HGB 13.7 08/13/2018    WBC 6.2 08/13/2018     08/13/2018    AST 24 09/13/2022    ALT 20 09/13/2022    ALKPHOS 59 09/13/2022    BILITOTAL 0.6 09/13/2022           Assessment/Plan:   1. Sebaceous cyst      Prior infected sebaceous cyst with either residual cyst, ingrown hair, or abnormal healing scar tissue at the site of her prior I&D.  Either way, there is no evidence of infection currently and I think an excision under local anesthetic would be a reasonable solution to her current issue.    5 minutes spent on the date of the encounter doing chart review, patient visit and documentation    Moiz Aviles MD ,MD  [facility] Department of Surgery      Again, thank you for allowing me to participate in the care of your patient.        Sincerely,        Moiz Aviles MD    "

## 2023-01-05 NOTE — PROGRESS NOTES
HPI: Marge Simmons is a 56 year old female referred to see me by Perico Viramontes for a right axillary nodule.  She is known to me from undergoing an abscess I&D at this location last year.  Notes that she has been aware of a small firm cutaneous lump just above her incision and would like to have this ultimately removed.    Allergies:Azithromycin    Prior Medical History: No past medical history on file.    Prior Surgical History:   Past Surgical History:   Procedure Laterality Date      SECTION  2007     DILATION AND CURETTAGE, DIAGNOSTIC / THERAPEUTIC  2008     OOPHORECTOMY Bilateral 2012     OVARIAN CYST REMOVAL  2007     [facility]    [facility]        CURRENT MEDS:  Prior to Admission medications    Medication Sig Start Date End Date Taking? Authorizing Provider   amoxicillin-clavulanate (AUGMENTIN) 875-125 MG tablet Take 1 tablet by mouth 2 times daily 21   Perico Viramontes MD   biotin 1,000 mcg Chew [BIOTIN 1,000 MCG CHEW] Chew. 21   Provider, Historical   estradiol (ESTRACE) 1 MG tablet Take 1 tablet (1 mg) by mouth daily 21   Perico Viramontes MD   multivitamin with minerals (THERA-M) 9 mg iron-400 mcg Tab tablet [MULTIVITAMIN WITH MINERALS (THERA-M) 9 MG IRON-400 MCG TAB TABLET] Take 1 tablet by mouth daily. 10/26/16   Provider, Historical   progesterone (PROMETRIUM) 100 MG capsule TAKE 1 CAPSULE BY MOUTH AT BEDTIME. 21   Perico Viramontes MD   spironolactone (ALDACTONE) 25 MG tablet Take 1 tablet (25 mg) by mouth 2 times daily 21   Perico Viramontes MD   vitamin B complex-folic acid (B COMPLEX 1, WITH FOLIC ACID,) 0.4 mg Tab [VITAMIN B COMPLEX-FOLIC ACID (B COMPLEX 1, WITH FOLIC ACID,) 0.4 MG TAB] Take by mouth. 21   Provider, Historical         Family History   Problem Relation Age of Onset     Prostate Cancer Father         stage 1     Colon  "Cancer Maternal Grandmother 55.00     Parkinsonism Maternal Grandfather      Stomach Cancer Paternal Grandfather 79.00     Tongue cancer Maternal Uncle 60.00        reports that she has never smoked. She has never used smokeless tobacco. She reports that she does not drink alcohol.    History   Smoking Status     Never   Smokeless Tobacco     Never       Review of Systems -    The 10 point review of systems is within normal limits except as noted in HPI.    /74   Ht 1.6 m (5' 3\")   Wt 74.4 kg (164 lb)   BMI 29.05 kg/m    164 lbs 0 oz  Body mass index is 29.05 kg/m .    EXAM:  GENERAL: Alert, cooperative, appears stated age  SKIN: 5 mm area of firm skin with slight discoloration, appears more superficial than I would expect for sebaceous cyst and may reflect an underlying ingrown hair or trapped epidermal tissue.    LABS:  Lab Results   Component Value Date    HGB 13.7 08/13/2018    WBC 6.2 08/13/2018     08/13/2018    AST 24 09/13/2022    ALT 20 09/13/2022    ALKPHOS 59 09/13/2022    BILITOTAL 0.6 09/13/2022           Assessment/Plan:   1. Sebaceous cyst      Prior infected sebaceous cyst with either residual cyst, ingrown hair, or abnormal healing scar tissue at the site of her prior I&D.  Either way, there is no evidence of infection currently and I think an excision under local anesthetic would be a reasonable solution to her current issue.    5 minutes spent on the date of the encounter doing chart review, patient visit and documentation    Moiz Aviles MD ,MD  [facility] Department of Surgery  "

## 2023-01-13 ENCOUNTER — OFFICE VISIT (OUTPATIENT)
Dept: SURGERY | Facility: CLINIC | Age: 58
End: 2023-01-13
Payer: COMMERCIAL

## 2023-01-13 VITALS — DIASTOLIC BLOOD PRESSURE: 76 MMHG | SYSTOLIC BLOOD PRESSURE: 120 MMHG

## 2023-01-13 DIAGNOSIS — L72.3 SEBACEOUS CYST: Primary | ICD-10-CM

## 2023-01-13 PROCEDURE — 11402 EXC TR-EXT B9+MARG 1.1-2 CM: CPT | Performed by: SURGERY

## 2023-01-13 NOTE — PROCEDURES
The skin overlying the right axillary cyst was marked.  We then cleaned it with alcohol, before injecting 10 cc of local anesthetic.  He was then reprepped with Betadine.  An elliptical incision 2 cm long by 1 cm wide was then made around the sebaceous cyst, and the area was sharply excised down to the underlying subcutaneous tissues.  After ensuring no bleeding, this wound was closed with running 4-0 Vicryl suture.    Moiz Aviles MD, FACS  [phone]  [facility]  General and Bariatric Surgery

## 2023-01-13 NOTE — LETTER
1/13/2023         RE: Marge Simmons  [address] [address]        Dear Colleague,    Thank you for referring your patient, Marge Simmons, to the [facility]. Please see a copy of my visit note below.    See procedure note      Again, thank you for allowing me to participate in the care of your patient.        Sincerely,        Moiz Aviles MD

## 2023-03-16 ENCOUNTER — OFFICE VISIT (OUTPATIENT)
Dept: SURGERY | Facility: CLINIC | Age: 58
End: 2023-03-16
Payer: COMMERCIAL

## 2023-03-16 DIAGNOSIS — Z98.890 POST-OPERATIVE STATE: Primary | ICD-10-CM

## 2023-03-16 PROCEDURE — 99212 OFFICE O/P EST SF 10 MIN: CPT | Performed by: SURGERY

## 2023-03-16 NOTE — LETTER
3/16/2023         RE: Marge Simmons  [address] [address]        Dear Colleague,    Thank you for referring your patient, Marge Simmons, to the [facility] SURGERY CLINIC AND BARIATRICS CARE [place]. Please see a copy of my visit note below.    Marge Simmons is status post abscess excision 9 weeks ago.  She returns today due to concern for some mild swelling and ache distal to the scar from her procedure in January.  Notes the discomfort is fairly mild, but is still present.    EXAM:  There were no vitals taken for this visit.  GENERAL: Well developed female, No acute distress, pleasant and conversant   SKIN: Well-healed right axillary scar.  Slight redness remains in the scar, appropriate for the stage and is healing.  No surrounding erythema.  No ballotable fluid collection.  Slight puffiness of the skin and soft tissues distal to the scar, but not consistent with lymphedema.      ASSESSMENT AND PLAN:  Normal healing.  I reassured her that there is nothing bad going on here.  The scar can take up to a year to lose the hyperemia causing the current discoloration.    Moiz Aviles MD, FACS  Office: [phone]  [facility]   General and Bariatric Surgery      Again, thank you for allowing me to participate in the care of your patient.        Sincerely,        Moiz Aviles MD

## 2023-03-17 NOTE — PROGRESS NOTES
Marge Simmons is status post abscess excision 9 weeks ago.  She returns today due to concern for some mild swelling and ache distal to the scar from her procedure in January.  Notes the discomfort is fairly mild, but is still present.    EXAM:  There were no vitals taken for this visit.  GENERAL: Well developed female, No acute distress, pleasant and conversant   SKIN: Well-healed right axillary scar.  Slight redness remains in the scar, appropriate for the stage and is healing.  No surrounding erythema.  No ballotable fluid collection.  Slight puffiness of the skin and soft tissues distal to the scar, but not consistent with lymphedema.      ASSESSMENT AND PLAN:  Normal healing.  I reassured her that there is nothing bad going on here.  The scar can take up to a year to lose the hyperemia causing the current discoloration.    Moiz Aviles MD, FACS  Office: [phone]  [facility]   General and Bariatric Surgery

## 2023-08-14 ENCOUNTER — PATIENT OUTREACH (OUTPATIENT)
Dept: CARE COORDINATION | Facility: CLINIC | Age: 58
End: 2023-08-14
Payer: COMMERCIAL

## 2023-08-28 ENCOUNTER — PATIENT OUTREACH (OUTPATIENT)
Dept: CARE COORDINATION | Facility: CLINIC | Age: 58
End: 2023-08-28
Payer: COMMERCIAL

## 2023-09-05 ENCOUNTER — HOSPITAL ENCOUNTER (OUTPATIENT)
Dept: MAMMOGRAPHY | Facility: CLINIC | Age: 58
Discharge: HOME | End: 2023-09-05
Attending: FAMILY MEDICINE | Admitting: FAMILY MEDICINE
Payer: COMMERCIAL

## 2023-09-05 DIAGNOSIS — Z12.31 VISIT FOR SCREENING MAMMOGRAM: ICD-10-CM

## 2023-09-05 PROCEDURE — 77067 SCR MAMMO BI INCL CAD: CPT

## 2023-09-28 DIAGNOSIS — Z78.0 POSTMENOPAUSAL STATUS: ICD-10-CM

## 2023-09-28 RX ORDER — ESTRADIOL 1 MG/1
1 TABLET ORAL DAILY
Qty: 90 TABLET | Refills: 3 | Status: SHIPPED | OUTPATIENT
Start: 2023-09-28 | End: 2023-11-21

## 2023-10-11 DIAGNOSIS — Z78.0 POSTMENOPAUSAL STATUS: ICD-10-CM

## 2023-10-11 DIAGNOSIS — L65.9 HAIR LOSS: ICD-10-CM

## 2023-10-11 RX ORDER — PROGESTERONE 100 MG/1
CAPSULE ORAL
Qty: 90 CAPSULE | Refills: 0 | Status: SHIPPED | OUTPATIENT
Start: 2023-10-11 | End: 2023-11-21

## 2023-10-11 RX ORDER — SPIRONOLACTONE 25 MG/1
TABLET ORAL
Qty: 180 TABLET | Refills: 0 | Status: SHIPPED | OUTPATIENT
Start: 2023-10-11 | End: 2024-01-15

## 2023-10-15 ENCOUNTER — HEALTH MAINTENANCE LETTER (OUTPATIENT)
Age: 58
End: 2023-10-15

## 2023-11-18 ASSESSMENT — ENCOUNTER SYMPTOMS
HEADACHES: 0
BREAST MASS: 0
SORE THROAT: 0
DIZZINESS: 0
JOINT SWELLING: 0
NERVOUS/ANXIOUS: 0
PALPITATIONS: 0
WEAKNESS: 0
PARESTHESIAS: 0
EYE PAIN: 0
DYSURIA: 0
FREQUENCY: 0
CONSTIPATION: 0
COUGH: 0
FEVER: 0
HEARTBURN: 0
HEMATURIA: 0
CHILLS: 0
SHORTNESS OF BREATH: 0
DIARRHEA: 0
HEMATOCHEZIA: 0
MYALGIAS: 0
ABDOMINAL PAIN: 0
NAUSEA: 0
ARTHRALGIAS: 0

## 2023-11-21 ENCOUNTER — OFFICE VISIT (OUTPATIENT)
Dept: FAMILY MEDICINE | Facility: CLINIC | Age: 58
End: 2023-11-21
Payer: COMMERCIAL

## 2023-11-21 VITALS
TEMPERATURE: 98.3 F | RESPIRATION RATE: 12 BRPM | SYSTOLIC BLOOD PRESSURE: 107 MMHG | OXYGEN SATURATION: 97 % | DIASTOLIC BLOOD PRESSURE: 71 MMHG | HEIGHT: 63 IN | BODY MASS INDEX: 28.7 KG/M2 | HEART RATE: 73 BPM | WEIGHT: 162 LBS

## 2023-11-21 DIAGNOSIS — Z78.0 POSTMENOPAUSAL STATUS: ICD-10-CM

## 2023-11-21 DIAGNOSIS — Z00.00 VISIT FOR PREVENTIVE HEALTH EXAMINATION: Primary | ICD-10-CM

## 2023-11-21 DIAGNOSIS — Z12.11 SCREEN FOR COLON CANCER: ICD-10-CM

## 2023-11-21 DIAGNOSIS — Z90.722: ICD-10-CM

## 2023-11-21 DIAGNOSIS — Z12.4 CERVICAL CANCER SCREENING: ICD-10-CM

## 2023-11-21 DIAGNOSIS — L65.9 HAIR LOSS: ICD-10-CM

## 2023-11-21 PROCEDURE — 82565 ASSAY OF CREATININE: CPT | Performed by: NURSE PRACTITIONER

## 2023-11-21 PROCEDURE — 84132 ASSAY OF SERUM POTASSIUM: CPT | Performed by: NURSE PRACTITIONER

## 2023-11-21 PROCEDURE — 99213 OFFICE O/P EST LOW 20 MIN: CPT | Mod: 25 | Performed by: NURSE PRACTITIONER

## 2023-11-21 PROCEDURE — 80061 LIPID PANEL: CPT | Performed by: NURSE PRACTITIONER

## 2023-11-21 PROCEDURE — G0145 SCR C/V CYTO,THINLAYER,RESCR: HCPCS | Performed by: NURSE PRACTITIONER

## 2023-11-21 PROCEDURE — 36415 COLL VENOUS BLD VENIPUNCTURE: CPT | Performed by: NURSE PRACTITIONER

## 2023-11-21 PROCEDURE — 99396 PREV VISIT EST AGE 40-64: CPT | Performed by: NURSE PRACTITIONER

## 2023-11-21 PROCEDURE — 84295 ASSAY OF SERUM SODIUM: CPT | Performed by: NURSE PRACTITIONER

## 2023-11-21 PROCEDURE — 82947 ASSAY GLUCOSE BLOOD QUANT: CPT | Performed by: NURSE PRACTITIONER

## 2023-11-21 PROCEDURE — 87624 HPV HI-RISK TYP POOLED RSLT: CPT | Performed by: NURSE PRACTITIONER

## 2023-11-21 RX ORDER — ESTRADIOL 1 MG/1
1 TABLET ORAL DAILY
Qty: 90 TABLET | Refills: 3 | Status: SHIPPED | OUTPATIENT
Start: 2023-11-21

## 2023-11-21 RX ORDER — PROGESTERONE 100 MG/1
CAPSULE ORAL
Qty: 90 CAPSULE | Refills: 3 | Status: SHIPPED | OUTPATIENT
Start: 2023-11-21

## 2023-11-21 ASSESSMENT — ENCOUNTER SYMPTOMS
HEARTBURN: 0
HEMATURIA: 0
CHILLS: 0
BREAST MASS: 0
PALPITATIONS: 0
DYSURIA: 0
DIZZINESS: 0
SORE THROAT: 0
FREQUENCY: 0
MYALGIAS: 0
FEVER: 0
JOINT SWELLING: 0
NERVOUS/ANXIOUS: 0
ABDOMINAL PAIN: 0
CONSTIPATION: 0
HEMATOCHEZIA: 0
EYE PAIN: 0
COUGH: 0
DIARRHEA: 0
PARESTHESIAS: 0
ARTHRALGIAS: 0
HEADACHES: 0
WEAKNESS: 0
SHORTNESS OF BREATH: 0
NAUSEA: 0

## 2023-11-21 NOTE — PROGRESS NOTES
SUBJECTIVE:   Marge is a 58 year old, presenting for the following:  Physical and Gyn Exam    -Has overall been feeling well this past year.  History of both ovaries removed at least 10 years ago.  She was placed on progesterone and estradiol a couple of years ago.  She denied having issues with hot flashes but was noticing symptoms with acne and hair loss.  She was also placed on spironolactone to assist with this, and really has not noticed any difference.  She completed a mammogram in September 2023.  She denied any history of breast cancer or blood clots.  She does not smoke cigarettes.         11/21/2023    12:52 PM   Additional Questions   Roomed by Bon       Healthy Habits:     Getting at least 3 servings of Calcium per day:  Yes    Bi-annual eye exam:  Yes    Dental care twice a year:  Yes    Sleep apnea or symptoms of sleep apnea:  None    Diet:  Regular (no restrictions)    Frequency of exercise:  4-5 days/week    Duration of exercise:  30-45 minutes    Taking medications regularly:  Yes    Medication side effects:  None    Additional concerns today:  No      Today's PHQ-2 Score:       11/20/2023     8:07 PM   PHQ-2 ( 1999 [employer])   Q1: Little interest or pleasure in doing things 1   Q2: Feeling down, depressed or hopeless 0   PHQ-2 Score 1   Q1: Little interest or pleasure in doing things Several days   Q2: Feeling down, depressed or hopeless Not at all   PHQ-2 Score 1       Chief Complaint   Patient presents with    Physical    Gyn Exam          Social History     Tobacco Use    Smoking status: Never     Passive exposure: Never    Smokeless tobacco: Never   Substance Use Topics    Alcohol use: No             11/18/2023     7:20 PM   Alcohol Use   Prescreen: >3 drinks/day or >7 drinks/week? Not Applicable          No data to display              Reviewed orders with patient.  Reviewed health maintenance and updated orders accordingly - Yes  Lab work is in process  Labs reviewed in EPIC    Breast  Cancer Screenin/12/2022     1:23 PM   Breast CA Risk Assessment (FHS-7)   Do you have a family history of breast, colon, or ovarian cancer? No / Unknown         -Mammogram is due every year related to estradiol use.   Pertinent mammograms are reviewed under the imaging tab.    History of abnormal Pap smear: NO - age 30-65 PAP every 5 years with negative HPV co-testing recommended      Latest Ref Rng & Units 2018    11:02 AM   PAP / HPV   PAP  Negative for squamous intraepithelial lesion or malignancy  Electronically signed by Corrina Martines CT (ASCP) on 2018 at  4:16 PM      HPV 16 DNA NEG Negative    HPV 18 DNA NEG Negative    Other HR HPV NEG Negative      Reviewed and updated as needed this visit by clinical staff   Tobacco  Allergies  Meds              Reviewed and updated as needed this visit by Provider                 No past medical history on file.   Past Surgical History:   Procedure Laterality Date    ABDOMEN SURGERY  ,     BIOPSY       SECTION  2007    COLONOSCOPY  ?    DILATION AND CURETTAGE, DIAGNOSTIC / THERAPEUTIC  2008    OOPHORECTOMY Bilateral 2012    OVARIAN CYST REMOVAL  2007    ZZC EXCIS UTERINE FIBROID,ABD APPRCH  1999    Uterine Myomectomy        Review of Systems   Constitutional:  Negative for chills and fever.   HENT:  Negative for congestion, ear pain, hearing loss and sore throat.    Eyes:  Negative for pain and visual disturbance.   Respiratory:  Negative for cough and shortness of breath.    Cardiovascular:  Negative for chest pain, palpitations and peripheral edema.   Gastrointestinal:  Negative for abdominal pain, constipation, diarrhea, heartburn, hematochezia and nausea.   Breasts:  Negative for tenderness, breast mass and discharge.   Genitourinary:  Negative for dysuria, frequency, genital sores, hematuria, pelvic pain, urgency, vaginal bleeding and vaginal discharge.   Musculoskeletal:  Negative for  "arthralgias, joint swelling and myalgias.   Skin:  Negative for rash.   Neurological:  Negative for dizziness, weakness, headaches and paresthesias.   Psychiatric/Behavioral:  Negative for mood changes. The patient is not nervous/anxious.           OBJECTIVE:   /71   Pulse 73   Temp 98.3  F (36.8  C) (Oral)   Resp 12   Ht 1.594 m (5' 2.75\")   Wt 73.5 kg (162 lb)   SpO2 97%   BMI 28.93 kg/m    Physical Exam  GENERAL: healthy, alert and no distress  EYES: Eyes grossly normal to inspection, PERRL and conjunctivae and sclerae normal  NECK: no adenopathy, no asymmetry, masses, or scars and thyroid normal to palpation  RESP: lungs clear to auscultation - no rales, rhonchi or wheezes  BREAST: normal without masses, tenderness or nipple discharge and no palpable axillary masses or adenopathy  CV: regular rate and rhythm, normal S1 S2, no S3 or S4, no murmur, click or rub, no peripheral edema and peripheral pulses strong  ABDOMEN: soft, nontender, no hepatosplenomegaly, no masses and bowel sounds normal  MS: no gross musculoskeletal defects noted, no edema  SKIN: no suspicious lesions or rashes  NEURO: Normal strength and tone, mentation intact and speech normal  PSYCH: mentation appears normal, affect normal/bright  LYMPH: no cervical, supraclavicular, axillary, or inguinal adenopathy    Diagnostic Test Results:  Labs reviewed in Epic    ASSESSMENT/PLAN:       ICD-10-CM    1. Visit for preventive health examination  Z00.00 Creatinine     Potassium     Sodium     Lipid Profile (Chol, Trig, HDL, LDL calc)     Glucose     Creatinine     Potassium     Sodium     Lipid Profile (Chol, Trig, HDL, LDL calc)     Glucose      2. Screen for colon cancer  Z12.11 Colonoscopy Screening  Referral      3. Cervical cancer screening  Z12.4 Pap Screen with HPV - recommended age 30 - 65 years      4. Postmenopausal status  Z78.0 estradiol (ESTRACE) 1 MG tablet     progesterone (PROMETRIUM) 100 MG capsule      5. Hair loss " " L65.9       6. History of removal of both ovaries  Z90.722         -Since the patient did not notice any changes lactone usage, she can slowly wean down this medication and reassess her symptoms.  If they recur, we could look at restarting the medication, but probably at a higher dose.   -Has been on estradiol and progesterone for a couple of years.  No history of breast cancer or blood clots.  She is a non-smoker.  She can continue this medication.  I encourage mammograms at least every year.  Side effects of the medications were discussed today.   -Labs were reviewed with patient today.  Blood pressure is stable.     Patient has been advised of split billing requirements and indicates understanding: Yes      COUNSELING:  Reviewed preventive health counseling, as reflected in patient instructions      BMI:   Estimated body mass index is 29.05 kg/m  as calculated from the following:    Height as of 1/5/23: 1.6 m (5' 3\").    Weight as of 1/5/23: 74.4 kg (164 lb).   Weight management plan: Discussed healthy diet and exercise guidelines      She reports that she has never smoked. She has never been exposed to tobacco smoke. She has never used smokeless tobacco.          FELICE Perdue CNP  [facility]  "

## 2023-11-22 LAB
CHOLEST SERPL-MCNC: 181 MG/DL
CREAT SERPL-MCNC: 0.68 MG/DL (ref 0.51–0.95)
EGFRCR SERPLBLD CKD-EPI 2021: >90 ML/MIN/1.73M2
FASTING STATUS PATIENT QL REPORTED: YES
GLUCOSE SERPL-MCNC: 84 MG/DL (ref 70–99)
HDLC SERPL-MCNC: 49 MG/DL
LDLC SERPL CALC-MCNC: 119 MG/DL
NONHDLC SERPL-MCNC: 132 MG/DL
POTASSIUM SERPL-SCNC: 4.2 MMOL/L (ref 3.4–5.3)
SODIUM SERPL-SCNC: 139 MMOL/L (ref 135–145)
TRIGL SERPL-MCNC: 64 MG/DL

## 2023-11-24 LAB
BKR LAB AP GYN ADEQUACY: NORMAL
BKR LAB AP GYN INTERPRETATION: NORMAL
BKR LAB AP HPV REFLEX: NORMAL
BKR LAB AP PREVIOUS ABNORMAL: NORMAL
PATH REPORT.COMMENTS IMP SPEC: NORMAL
PATH REPORT.COMMENTS IMP SPEC: NORMAL
PATH REPORT.RELEVANT HX SPEC: NORMAL

## 2023-11-27 LAB
HUMAN PAPILLOMA VIRUS 16 DNA: NEGATIVE
HUMAN PAPILLOMA VIRUS 18 DNA: NEGATIVE
HUMAN PAPILLOMA VIRUS FINAL DIAGNOSIS: NORMAL
HUMAN PAPILLOMA VIRUS OTHER HR: NEGATIVE

## 2023-12-05 NOTE — RESULT ENCOUNTER NOTE
Anuj Bird    It was a pleasure meeting you!    Your cholesterol appears stable! Good news, you do not need to start a statin medication.     Your kidney function and glucose are also within a normal range!    If you have any questions, please let me know.     Dianna

## 2024-01-15 DIAGNOSIS — L65.9 HAIR LOSS: ICD-10-CM

## 2024-01-15 RX ORDER — SPIRONOLACTONE 25 MG/1
TABLET ORAL
Qty: 180 TABLET | Refills: 3 | Status: SHIPPED | OUTPATIENT
Start: 2024-01-15

## 2024-11-11 ENCOUNTER — HOSPITAL ENCOUNTER (OUTPATIENT)
Dept: MAMMOGRAPHY | Facility: CLINIC | Age: 59
Discharge: HOME | End: 2024-11-11
Attending: NURSE PRACTITIONER | Admitting: NURSE PRACTITIONER
Payer: COMMERCIAL

## 2024-11-11 DIAGNOSIS — Z12.31 VISIT FOR SCREENING MAMMOGRAM: ICD-10-CM

## 2024-11-11 PROCEDURE — 77063 BREAST TOMOSYNTHESIS BI: CPT

## 2025-01-25 ENCOUNTER — TRANSFERRED RECORDS (OUTPATIENT)
Dept: HEALTH INFORMATION MANAGEMENT | Facility: CLINIC | Age: 60
End: 2025-01-25
Payer: COMMERCIAL

## 2025-02-25 ENCOUNTER — TELEPHONE (OUTPATIENT)
Dept: GASTROENTEROLOGY | Facility: CLINIC | Age: 60
End: 2025-02-25
Payer: COMMERCIAL

## 2025-02-25 NOTE — TELEPHONE ENCOUNTER
"Pre visit planning completed.      Procedure details:    Patient scheduled for Colonoscopy on 3/12/25.     Arrival time: 0645. Procedure time 0730    Facility [facility] [place] [address]. Check in location: 1st Floor [facility].     Sedation type: Conscious sedation  -Per 11/22/24 OV note: \"Her KOLBY-7 is elevated\" and \"Her anxiety has not been new, but chronic.\"   Writer unable to view what the KOLBY-7 score was.  Writer unable to view prior colonoscopy report from 2013.  Discuss sedation expectations with patient to ensure she is comfortable proceeding with moderate sedation.     Pre op exam needed? No.    Indication for procedure: Screening      Chart review:     Electronic implanted devices? No    Recent diagnosis of diverticulitis within the last 6 weeks? No      Medication review:    Diabetic? No    Anticoagulants? No    Weight loss medication/injectable? No GLP-1 medication per patient's medication list. Nursing to verify with pre-assessment call.    Other medication HOLDING recommendations:  N/A      Prep for procedure:     Bowel prep recommendation: Standard Miralax.   Due to: standard bowel prep    Procedure information and instructions sent via [employer]         Dariela Tony RN  Endoscopy Procedure Pre Assessment   [phone] option 3  "

## 2025-02-25 NOTE — TELEPHONE ENCOUNTER
Attempted to contact patient in order to complete pre assessment questions.     No answer. Left message to return call to [phone] option 3    Callback communication sent via [employer].    Analia Maynard RN  Endoscopy Procedure Pre Assessment

## 2025-02-27 NOTE — TELEPHONE ENCOUNTER
Pre assessment completed for upcoming procedure.   (Please see previous telephone encounter notes for complete details)    Patient returned call.       Procedure details:    Arrival time and facility location reviewed.    Pre op exam needed? No.    Designated  policy reviewed. Instructed to have someone stay 6  hours post procedure.     The Pt is comfortable with this level of sedation.      Medication review:    Medications reviewed. Please see supporting documentation below. Holding recommendations discussed (if applicable).       Prep for procedure:     Procedure prep instructions reviewed.        Any additional information needed:  N/A      Patient verbalized understanding and had no questions or concerns at this time.      Devi Rodriguez RN  Endoscopy Procedure Pre Assessment   [phone] option 3

## 2025-03-11 RX ORDER — LIDOCAINE 40 MG/G
CREAM TOPICAL
Status: CANCELLED | OUTPATIENT
Start: 2025-03-11

## 2025-03-11 RX ORDER — ONDANSETRON 2 MG/ML
4 INJECTION INTRAMUSCULAR; INTRAVENOUS
Status: CANCELLED | OUTPATIENT
Start: 2025-03-11

## 2025-03-12 ENCOUNTER — HOSPITAL ENCOUNTER (OUTPATIENT)
Facility: CLINIC | Age: 60
Discharge: HOME | End: 2025-03-12
Attending: COLON & RECTAL SURGERY | Admitting: COLON & RECTAL SURGERY
Payer: COMMERCIAL

## 2025-03-12 VITALS
SYSTOLIC BLOOD PRESSURE: 108 MMHG | BODY MASS INDEX: 28.35 KG/M2 | RESPIRATION RATE: 14 BRPM | OXYGEN SATURATION: 96 % | HEART RATE: 60 BPM | DIASTOLIC BLOOD PRESSURE: 78 MMHG | WEIGHT: 160 LBS | HEIGHT: 63 IN

## 2025-03-12 LAB — COLONOSCOPY: NORMAL

## 2025-03-12 PROCEDURE — 45378 DIAGNOSTIC COLONOSCOPY: CPT | Performed by: COLON & RECTAL SURGERY

## 2025-03-12 PROCEDURE — G0121 COLON CA SCRN NOT HI RSK IND: HCPCS

## 2025-03-12 PROCEDURE — 250N000011 HC RX IP 250 OP 636: Performed by: COLON & RECTAL SURGERY

## 2025-03-12 PROCEDURE — G0500 MOD SEDAT ENDO SERVICE >5YRS: HCPCS | Performed by: COLON & RECTAL SURGERY

## 2025-03-12 RX ORDER — FENTANYL CITRATE 50 UG/ML
INJECTION, SOLUTION INTRAMUSCULAR; INTRAVENOUS PRN
Status: DISCONTINUED | OUTPATIENT
Start: 2025-03-12 | End: 2025-03-12 | Stop reason: HOSPADM

## 2025-03-12 ASSESSMENT — ACTIVITIES OF DAILY LIVING (ADL)
ADLS_ACUITY_SCORE: 41
ADLS_ACUITY_SCORE: 41

## 2025-03-12 NOTE — H&P
Colon & Rectal Surgery History and Physical  Pre-Endoscopy Procedure Note    History of Present Illness   I have been asked by Stephanie Whitaker to evaluate this 59 year old female for colorectal cancer screening. She currently denies any abdominal pain, weight loss, bleeding per rectum, or recent change in bowel habits.    Past Medical History   History reviewed. No pertinent past medical history.    Past Surgical History  Procedure Laterality Date    BIOPSY       SECTION  2007    DILATION AND CURETTAGE  2008    OOPHORECTOMY Bilateral 2012    OVARIAN CYST REMOVAL  2007    [facility]    Uterine My[facility]         Medications  Medications Prior to Admission   Medication Sig    biotin 1,000 mcg Chew [BIOTIN 1,000 MCG CHEW] Chew.    citalopram (CELEXA) 10 MG tablet Take 1 tablet (10 mg) by mouth daily.    estradiol (ESTRACE) 1 MG tablet Take 1 tablet (1 mg) by mouth daily.    multivitamin with minerals (THERA-M) 9 mg iron-400 mcg Tab tablet [MULTIVITAMIN WITH MINERALS (THERA-M) 9 MG IRON-400 MCG TAB TABLET] Take 1 tablet by mouth daily.    progesterone (PROMETRIUM) 100 MG capsule TAKE 1 CAPSULE BY MOUTH EVERYDAY AT BEDTIME    spironolactone (ALDACTONE) 25 MG tablet TAKE 1 TABLET BY MOUTH TWICE A DAY       Allergies  Allergen Reactions    Azithromycin Unknown        Family History   Family history includes Colon Cancer (age of onset: 55) in her maternal grandmother; Parkinsonism in her maternal grandfather; Prostate Cancer in her father; Stomach Cancer (age of onset: 79) in her paternal grandfather; Tongue cancer (age of onset: 60) in her maternal uncle.     Social History   She reports that she has never smoked. She has never been exposed to tobacco smoke. She has never used smokeless tobacco. She reports that she does not drink alcohol and does not use drugs.    Review of Systems   Constitutional:  No fever, weight change or fatigue.    Eyes:     No dry  "eyes or vision changes.   Ears/Nose/Throat/Neck:  No oral ulcers, sore throat or voice change.    Cardiovascular:   No palpitations, syncope, angina or edema.   Respiratory:    No chest pain, excessive sleepiness, shortness of breath or hemoptysis.    Gastrointestinal:   No abdominal pain, nausea, vomiting, diarrhea or heartburn.    Genitourinary:   No dysuria, hematuria, urinary retention or urinary frequency.   Musculoskeletal:  No joint swelling or arthralgias.    Dermatologic:  No skin rash or other skin changes.   Neurologic:    No focal weakness or numbness. No neuropathy.   Psychiatric:    No depression, anxiety, suicidal ideation, or paranoid ideation.   Endocrine:   No cold or heat intolerance, polydipsia, hirsutism, change in libido, or flushing.   Hematology/Lymphatic:  No bleeding or lymphadenopathy.    Allergy/Immunology:  No rhinitis or hives.     Physical Exam   Vitals:  Blood pressure 127/84, pulse 74, resp. rate 16, height 1.6 m (5' 3\"), weight 72.6 kg (160 lb), SpO2 98%.    General:  Alert and oriented to person, place and time   Airway: Normal oropharyngeal airway and neck mobility   Lungs:  Clear bilaterally   Heart:  Regular rate and rhythm   Abdomen: Soft, NT, ND, no masses   Extremities: Warm, good capillary refill    ASA Grade: I (normal healthy patient)      Impression: Cleared for use of conscious sedation for colorectal cancer screening    Plan: Proceed with colonoscopy     Shawanda Rashid MD  [facility]  [phone]  "

## (undated) RX ORDER — FENTANYL CITRATE 50 UG/ML
INJECTION, SOLUTION INTRAMUSCULAR; INTRAVENOUS
Status: DISPENSED
Start: 2025-03-12